# Patient Record
Sex: MALE | Race: WHITE | NOT HISPANIC OR LATINO | Employment: FULL TIME | URBAN - METROPOLITAN AREA
[De-identification: names, ages, dates, MRNs, and addresses within clinical notes are randomized per-mention and may not be internally consistent; named-entity substitution may affect disease eponyms.]

---

## 2020-08-06 ENCOUNTER — CONSULT (OUTPATIENT)
Dept: PULMONOLOGY | Facility: CLINIC | Age: 50
End: 2020-08-06
Payer: COMMERCIAL

## 2020-08-06 VITALS
SYSTOLIC BLOOD PRESSURE: 122 MMHG | BODY MASS INDEX: 41.3 KG/M2 | WEIGHT: 295 LBS | HEIGHT: 71 IN | TEMPERATURE: 98 F | DIASTOLIC BLOOD PRESSURE: 86 MMHG | HEART RATE: 100 BPM | OXYGEN SATURATION: 94 %

## 2020-08-06 DIAGNOSIS — E66.01 OBESITY, MORBID (HCC): ICD-10-CM

## 2020-08-06 DIAGNOSIS — J45.40 MODERATE PERSISTENT ASTHMA WITHOUT COMPLICATION: ICD-10-CM

## 2020-08-06 DIAGNOSIS — R06.02 SOB (SHORTNESS OF BREATH) ON EXERTION: Primary | ICD-10-CM

## 2020-08-06 PROCEDURE — 99205 OFFICE O/P NEW HI 60 MIN: CPT | Performed by: INTERNAL MEDICINE

## 2020-08-06 RX ORDER — MONTELUKAST SODIUM 10 MG/1
TABLET ORAL
COMMUNITY
Start: 2020-06-29

## 2020-08-06 RX ORDER — FLUTICASONE PROPIONATE 50 MCG
SPRAY, SUSPENSION (ML) NASAL
COMMUNITY
Start: 2020-06-29

## 2020-08-06 RX ORDER — THEOPHYLLINE 400 MG/1
TABLET, EXTENDED RELEASE ORAL
COMMUNITY
Start: 2020-07-18

## 2020-08-06 RX ORDER — BUDESONIDE 0.5 MG/2ML
INHALANT ORAL
COMMUNITY
Start: 2020-06-19

## 2020-08-06 RX ORDER — BENRALIZUMAB 30 MG/ML
INJECTION, SOLUTION SUBCUTANEOUS
COMMUNITY
Start: 2020-08-03

## 2020-08-06 RX ORDER — PREDNISONE 1 MG/1
TABLET ORAL
COMMUNITY
Start: 2020-07-22

## 2020-08-06 RX ORDER — COLCHICINE 0.6 MG/1
CAPSULE ORAL
COMMUNITY
Start: 2020-07-21

## 2020-08-06 RX ORDER — LISINOPRIL 5 MG/1
TABLET ORAL
COMMUNITY
Start: 2020-06-29

## 2020-08-06 RX ORDER — ALLOPURINOL 100 MG/1
TABLET ORAL
COMMUNITY
Start: 2020-06-29

## 2020-08-06 RX ORDER — CHOLECALCIFEROL (VITAMIN D3) 1250 MCG
CAPSULE ORAL
COMMUNITY
Start: 2020-06-17

## 2020-08-06 RX ORDER — PREDNISONE 10 MG/1
TABLET ORAL
COMMUNITY
Start: 2020-07-22

## 2020-08-06 RX ORDER — INDOMETHACIN 50 MG/1
CAPSULE ORAL
COMMUNITY
Start: 2020-05-19

## 2020-08-06 RX ORDER — ZOLMITRIPTAN 5 MG/1
5 TABLET, FILM COATED ORAL ONCE AS NEEDED
COMMUNITY

## 2020-08-06 RX ORDER — ALBUTEROL SULFATE 2.5 MG/3ML
SOLUTION RESPIRATORY (INHALATION)
COMMUNITY
Start: 2020-06-19

## 2020-08-06 RX ORDER — TRAMADOL HYDROCHLORIDE 50 MG/1
50 TABLET ORAL EVERY 6 HOURS PRN
COMMUNITY

## 2020-08-06 RX ORDER — TIOTROPIUM BROMIDE INHALATION SPRAY 3.12 UG/1
SPRAY, METERED RESPIRATORY (INHALATION)
COMMUNITY
Start: 2020-07-23

## 2020-08-06 NOTE — PROGRESS NOTES
Assessment/Plan:     Diagnoses and all orders for this visit:    SOB (shortness of breath) on exertion  -     Complete PFT with post Bronchodilator and Six Minute walk; Future  -     CT chest without contrast; Future  -     Stress test only, exercise; Future  -     Echo complete with contrast if indicated; Future    Moderate persistent asthma without complication    Obesity, morbid ([facility] [address] )    Other orders  -     albuterol (2 5 mg/3 mL) 0 083 % nebulizer solution; USE 1 VIAL IN NEBULIZER QID PRN  -     allopurinol (ZYLOPRIM) 100 mg tablet; TK 2 TS PO QAM AND 2 TS HS  -     Fasenra subcutaneous injection  -     budesonide (PULMICORT) 0 5 mg/2 mL nebulizer solution; VVN BID PRN  -     Cholecalciferol (Vitamin D3) 1 25 MG (45145 UT) CAPS; TK 1 C PO 1 TIME Q WK TAT  -     Colchicine 0 6 MG CAPS  -     fluticasone (FLONASE) 50 mcg/act nasal spray; SHAKE LQ AND U 2 SPRAYS IEN D  -     Advair Diskus 500-50 MCG/DOSE inhaler; INL 1 PUFF PO BID  -     indomethacin (INDOCIN) 50 mg capsule; TAKE 1 CAPSULE BY MOUTH EVERY 8 HOURS IF NEEDED DURING GOUT FLARE WITH FOOD  -     lisinopril (ZESTRIL) 5 mg tablet; TK 1 T PO D  -     metFORMIN (GLUCOPHAGE) 500 mg tablet; TK 2 TS PO BID  -     montelukast (SINGULAIR) 10 mg tablet; TK 1 T PO QD  -     predniSONE 10 mg tablet  -     predniSONE 1 mg tablet; TK 3 TS PO Q 8 H PRN  -     theophylline (UNIPHYL) 400 mg 24 hr tablet; TK 1 T PO BID  -     Spiriva Respimat 2 5 MCG/ACT AERS inhaler; INL 2 PFS PO D  -     ZOLMitriptan (ZOMIG) 5 MG tablet; Take 5 mg by mouth once as needed for migraine  -     traMADol (ULTRAM) 50 mg tablet;  Take 50 mg by mouth every 6 (six) hours as needed for moderate pain      Deepak with significant asthma history, at least moderate persistent to severe currently not in any exacerbation with immunotherapy shots in the past currently on faserna, and on long-acting bronchodilators as well as the on prednisone 20 mg daily for several years currently trying to cut it down   Shortness of breath and dyspnea on exertion likely multifactorial secondary to his asthma his obesity, need to rule out cardiac issues, and likely pulmonary hypertension from obesity and likely obstructive sleep apnea  Do not have any old records to compare where trying to obtain his the prior records from prior pulmonologist   Will get complete PFT with bronchodilator lung volumes and DLCO and a 6 minutes walk test and follow-up  Also given long-term prednisone usage, I have asked him to get a CT of the chest without contrast to rule out any opportunistic infections  I have also discussed with him to follow-up with a cardiologist, he wants to get a stress exercise test done 1st and see if he needs to follow-up he states he does not have any family history of early coronary artery disease but given his obesity I have discussed with him the risk for coronary artery disease understands and verbalizes  Will order the stress exercise test  Also echocardiogram to rule out any evidence of pulmonary hypertension  High suspicion for obstructive sleep apnea given history of snoring and his body habitus, wants to think about testing at a later time  Recommend weight loss  For his asthma I have asked him to continue with his current medications that he is on with Advair 250/51 puff twice daily  Spiriva 1 puff daily  Continue with the current dosing of prednisone has been tapered down to 12 5 mg and he is slowly tapering it down he states as per his prior pulmonologist  Continue with Singulair 10 mg daily  Continue with theophylline 400 mg daily  He states he has had a theophylline level with his prior pulmonologist will try to obtain the records otherwise will he would need a theophylline level also to be tested    He will continue with his the faserna shots   Discussed regarding flu shot fall 2020  Recommend weight loss  Have spent 60 minutes with the patient discussing likely etiology for his shortness of breath and dyspnea on exertion and the radius testing and answered patient's questions with regards to asthma and sleep disordered breathing    Return in about 6 weeks (around 9/17/2020)  All questions are answered to the patient's satisfaction and understanding  He verbalizes understanding  He is encouraged to call with any further questions or concerns  Portions of the record may have been created with voice recognition software  Occasional wrong word or "sound a like" substitutions may have occurred due to the inherent limitations of voice recognition software  Read the chart carefully and recognize, using context, where substitutions have occurred  a    Electronically Signed by Patrick Castillo MD    ______________________________________________________________________    Chief Complaint:   Chief Complaint   Patient presents with    Asthma        Patient ID: Saravanan Estrella is a 44 y o  y o  male has a past medical history of Asthma and Kidney stones  8/6/2020  Patient presents today for initial visit  Saravanan Estrella  is a very pleasant 49-year-old gentleman with less than 5 pack-year smoking history states he smoked only for 2-3 years less than a pack  Has been diagnosed with asthma since childhood, although he was admitted multiple times into the hospital when he was young , as an adult although he has not had multiple admissions recently, prior admissions did not require any intubation he states, last ER visit about a year ago  Has been on long-acting bronchodilators as well as on immunotherapy shots since he was young, recently has been switched to faserna shots, and he was also placed on prednisone 20 mg daily for a long time, and he has been slowly trying to get the prednisone down    Currently for the past few months he states his asthma has not been acting up, had does not wheeze as much as before, but still has shortness of breath and dyspnea on exertion, with limitation of his daily current activities, does not have any chest pain or chest tightness  Occupational/Exposure history:  Works as a salesman  Pets/Enviroment:  Hypoallergenic dog  Travel history: none  Review of Systems   Constitutional: Positive for fatigue and unexpected weight change (weight gain in the past few years )  Negative for appetite change, chills, diaphoresis and fever  HENT: Positive for postnasal drip  Negative for congestion, ear discharge, ear pain, nosebleeds, rhinorrhea, sinus pain, sore throat and voice change  Eyes: Negative for pain, discharge and visual disturbance  Respiratory: Positive for shortness of breath (on exertion gradually worsening )  Negative for apnea, cough, choking, chest tightness, wheezing and stridor  Cardiovascular: Negative for chest pain, palpitations and leg swelling  Gastrointestinal: Negative for abdominal pain, blood in stool, constipation, diarrhea and vomiting  Endocrine: Negative for cold intolerance, heat intolerance, polydipsia, polyphagia and polyuria  Genitourinary: Negative for difficulty urinating and dysuria  Musculoskeletal: Negative for arthralgias and neck pain  Skin: Negative for pallor and rash  Allergic/Immunologic: Negative for environmental allergies and food allergies  Neurological: Negative for dizziness, speech difficulty, weakness and light-headedness  Hematological: Negative for adenopathy  Does not bruise/bleed easily  Psychiatric/Behavioral: Negative for agitation, confusion and sleep disturbance  The patient is not nervous/anxious  Social history: He reports that he has quit smoking  His smoking use included cigarettes  He does not have any smokeless tobacco history on file  Past surgical history: No past surgical history on file  Family history:   Family History   Problem Relation Age of Onset    COPD Mother          There is no immunization history on file for this patient    Current Outpatient Medications   Medication Sig Dispense Refill    Advair Diskus 500-50 MCG/DOSE inhaler INL 1 PUFF PO BID      albuterol (2 5 mg/3 mL) 0 083 % nebulizer solution USE 1 VIAL IN NEBULIZER QID PRN      allopurinol (ZYLOPRIM) 100 mg tablet TK 2 TS PO QAM AND 2 TS HS      budesonide (PULMICORT) 0 5 mg/2 mL nebulizer solution VVN BID PRN      Cholecalciferol (Vitamin D3) 1 25 MG (71805 UT) CAPS TK 1 C PO 1 TIME Q WK TAT      Colchicine 0 6 MG CAPS       Fasenra subcutaneous injection       fluticasone (FLONASE) 50 mcg/act nasal spray SHAKE LQ AND U 2 SPRAYS IEN D      indomethacin (INDOCIN) 50 mg capsule TAKE 1 CAPSULE BY MOUTH EVERY 8 HOURS IF NEEDED DURING GOUT FLARE WITH FOOD      montelukast (SINGULAIR) 10 mg tablet TK 1 T PO QD      predniSONE 1 mg tablet TK 3 TS PO Q 8 H PRN      predniSONE 10 mg tablet       Spiriva Respimat 2 5 MCG/ACT AERS inhaler INL 2 PFS PO D      theophylline (UNIPHYL) 400 mg 24 hr tablet TK 1 T PO BID      traMADol (ULTRAM) 50 mg tablet Take 50 mg by mouth every 6 (six) hours as needed for moderate pain      ZOLMitriptan (ZOMIG) 5 MG tablet Take 5 mg by mouth once as needed for migraine      lisinopril (ZESTRIL) 5 mg tablet TK 1 T PO D      metFORMIN (GLUCOPHAGE) 500 mg tablet TK 2 TS PO BID       No current facility-administered medications for this visit  Allergies: Patient has no known allergies  Objective:  Vitals:    08/06/20 1510   BP: 122/86   Pulse: 100   Temp: 98 °F (36 7 °C)   SpO2: 94%   Weight: 134 kg (295 lb)   Height: 5' 11" (1 803 m)   Oxygen Therapy  SpO2: 94 %    Wt Readings from Last 3 Encounters:   08/06/20 134 kg (295 lb)     Body mass index is 41 14 kg/m²  Physical Exam  Constitutional:       Appearance: He is well-developed  HENT:      Head: Normocephalic and atraumatic  Eyes:      Pupils: Pupils are equal, round, and reactive to light  Neck:      Musculoskeletal: Normal range of motion and neck supple        Comments: Short and wide neck  Cardiovascular:      Rate and Rhythm: Normal rate and regular rhythm  Heart sounds: Normal heart sounds  Pulmonary:      Effort: Pulmonary effort is normal       Breath sounds: Normal breath sounds  Abdominal:      General: Bowel sounds are normal       Palpations: Abdomen is soft  Musculoskeletal: Normal range of motion  Skin:     General: Skin is warm and dry  Neurological:      Mental Status: He is alert and oriented to person, place, and time  Psychiatric:         Behavior: Behavior normal            Diagnostics:  I have personally reviewed pertinent reports

## 2020-10-09 ENCOUNTER — TELEPHONE (OUTPATIENT)
Dept: PULMONOLOGY | Facility: CLINIC | Age: 50
End: 2020-10-09

## 2020-10-15 ENCOUNTER — HOSPITAL ENCOUNTER (OUTPATIENT)
Dept: NON INVASIVE DIAGNOSTICS | Facility: HOSPITAL | Age: 50
Discharge: HOME | End: 2020-10-15
Attending: INTERNAL MEDICINE
Payer: COMMERCIAL

## 2020-10-15 ENCOUNTER — APPOINTMENT (OUTPATIENT)
Dept: CT IMAGING | Facility: HOSPITAL | Age: 50
End: 2020-10-15
Attending: INTERNAL MEDICINE
Payer: COMMERCIAL

## 2020-10-15 DIAGNOSIS — R06.02 SOB (SHORTNESS OF BREATH) ON EXERTION: ICD-10-CM

## 2020-10-15 LAB
CHEST PAIN STATEMENT: NORMAL
MAX DIASTOLIC BP: 110 MMHG
MAX HEART RATE: 190 BPM
MAX PREDICTED HEART RATE: 168 BPM
MAX. SYSTOLIC BP: 230 MMHG
PROTOCOL NAME: NORMAL
REASON FOR TERMINATION: NORMAL
TARGET HR FORMULA: NORMAL
TEST INDICATION: NORMAL
TIME IN EXERCISE PHASE: NORMAL

## 2020-10-15 PROCEDURE — 93016 CV STRESS TEST SUPVJ ONLY: CPT

## 2020-10-15 PROCEDURE — 93018 CV STRESS TEST I&R ONLY: CPT

## 2020-10-15 PROCEDURE — 93306 TTE W/DOPPLER COMPLETE: CPT | Performed by: INTERNAL MEDICINE

## 2020-10-15 PROCEDURE — 93306 TTE W/DOPPLER COMPLETE: CPT

## 2020-10-15 PROCEDURE — 93017 CV STRESS TEST TRACING ONLY: CPT

## 2020-10-23 ENCOUNTER — HOSPITAL ENCOUNTER (OUTPATIENT)
Dept: PULMONOLOGY | Facility: HOSPITAL | Age: 50
Discharge: HOME | End: 2020-10-23
Attending: INTERNAL MEDICINE
Payer: COMMERCIAL

## 2020-10-23 DIAGNOSIS — R06.02 SOB (SHORTNESS OF BREATH) ON EXERTION: ICD-10-CM

## 2020-10-23 PROCEDURE — 94729 DIFFUSING CAPACITY: CPT

## 2020-10-23 PROCEDURE — 94761 N-INVAS EAR/PLS OXIMETRY MLT: CPT

## 2020-10-23 PROCEDURE — 94618 PULMONARY STRESS TESTING: CPT | Performed by: INTERNAL MEDICINE

## 2020-10-23 PROCEDURE — 94010 BREATHING CAPACITY TEST: CPT | Performed by: INTERNAL MEDICINE

## 2020-10-23 PROCEDURE — 94727 GAS DIL/WSHOT DETER LNG VOL: CPT

## 2020-10-23 PROCEDURE — 94727 GAS DIL/WSHOT DETER LNG VOL: CPT | Performed by: INTERNAL MEDICINE

## 2020-10-23 PROCEDURE — 94010 BREATHING CAPACITY TEST: CPT

## 2020-10-23 PROCEDURE — 94729 DIFFUSING CAPACITY: CPT | Performed by: INTERNAL MEDICINE

## 2024-07-10 LAB
CREAT ?TM UR-SCNC: 243.1 UMOL/L
EXT ALBUMIN URINE RANDOM: 12
HBA1C MFR BLD HPLC: 4.7 %
MICROALBUMIN/CREAT UR: 4.9 MG/G{CREAT}

## 2025-01-28 LAB — HBA1C MFR BLD HPLC: 4.6 %

## 2025-03-12 ENCOUNTER — TELEPHONE (OUTPATIENT)
Dept: SURGERY | Facility: CLINIC | Age: 55
End: 2025-03-12

## 2025-03-12 NOTE — TELEPHONE ENCOUNTER
Pt called, he was transferred over from Dr. Rosenbaum office. Pt needs to be seen for inguinal hernia. He states that Dr. Rosenbaum office does not have anything available for surgery after April. I informed him that I'm scheduling into April at the moment with both providers. He is scheduled to see Dr. Krueger on 3/19/25. In the meantime if it becomes more painful and protruding out more to please go the ED. Pt verbalized understanding.

## 2025-03-13 ENCOUNTER — TELEPHONE (OUTPATIENT)
Age: 55
End: 2025-03-13

## 2025-03-13 NOTE — TELEPHONE ENCOUNTER
Pt called and has an appointment on 03/19 with Dr. Krueger.  He is in a lot of pain and doesn't think he can wait until next week . He asked if Dr. Krueger could fit him in today.  I told him that I would ask but I did tell him that his schedule is full today.  Please call patient at [phone].

## 2025-03-14 ENCOUNTER — TELEPHONE (OUTPATIENT)
Age: 55
End: 2025-03-14

## 2025-03-14 ENCOUNTER — APPOINTMENT (EMERGENCY)
Dept: RADIOLOGY | Facility: HOSPITAL | Age: 55
End: 2025-03-14
Payer: COMMERCIAL

## 2025-03-14 ENCOUNTER — APPOINTMENT (EMERGENCY)
Dept: CT IMAGING | Facility: HOSPITAL | Age: 55
End: 2025-03-14
Payer: COMMERCIAL

## 2025-03-14 ENCOUNTER — HOSPITAL ENCOUNTER (EMERGENCY)
Facility: HOSPITAL | Age: 55
Discharge: HOME | End: 2025-03-14
Payer: COMMERCIAL

## 2025-03-14 VITALS
TEMPERATURE: 98 F | OXYGEN SATURATION: 98 % | SYSTOLIC BLOOD PRESSURE: 166 MMHG | HEIGHT: 71 IN | WEIGHT: 205 LBS | BODY MASS INDEX: 28.7 KG/M2 | HEART RATE: 56 BPM | RESPIRATION RATE: 16 BRPM | DIASTOLIC BLOOD PRESSURE: 84 MMHG

## 2025-03-14 DIAGNOSIS — M10.9 GOUT: ICD-10-CM

## 2025-03-14 DIAGNOSIS — K42.9 UMBILICAL HERNIA: Primary | ICD-10-CM

## 2025-03-14 DIAGNOSIS — M79.673 FOOT PAIN: ICD-10-CM

## 2025-03-14 LAB
ALBUMIN SERPL BCG-MCNC: 4.5 G/DL (ref 3.5–5)
ALP SERPL-CCNC: 111 U/L (ref 34–104)
ALT SERPL W P-5'-P-CCNC: 11 U/L (ref 7–52)
ANION GAP SERPL CALCULATED.3IONS-SCNC: 7 MMOL/L (ref 4–13)
AST SERPL W P-5'-P-CCNC: 19 U/L (ref 13–39)
BASOPHILS # BLD AUTO: 0.01 THOUSANDS/ÂΜL (ref 0–0.1)
BASOPHILS NFR BLD AUTO: 0 % (ref 0–1)
BILIRUB SERPL-MCNC: 0.74 MG/DL (ref 0.2–1)
BUN SERPL-MCNC: 17 MG/DL (ref 5–25)
CALCIUM SERPL-MCNC: 9.8 MG/DL (ref 8.4–10.2)
CHLORIDE SERPL-SCNC: 104 MMOL/L (ref 96–108)
CO2 SERPL-SCNC: 28 MMOL/L (ref 21–32)
CREAT SERPL-MCNC: 0.95 MG/DL (ref 0.6–1.3)
EOSINOPHIL # BLD AUTO: 0 THOUSAND/ÂΜL (ref 0–0.61)
EOSINOPHIL NFR BLD AUTO: 0 % (ref 0–6)
ERYTHROCYTE [DISTWIDTH] IN BLOOD BY AUTOMATED COUNT: 13.4 % (ref 11.6–15.1)
GFR SERPL CREATININE-BSD FRML MDRD: 88 ML/MIN/1.73SQ M
GLUCOSE SERPL-MCNC: 72 MG/DL (ref 65–140)
HCT VFR BLD AUTO: 43.9 % (ref 36.5–49.3)
HGB BLD-MCNC: 15 G/DL (ref 12–17)
IMM GRANULOCYTES # BLD AUTO: 0.03 THOUSAND/UL (ref 0–0.2)
IMM GRANULOCYTES NFR BLD AUTO: 0 % (ref 0–2)
LACTATE SERPL-SCNC: 0.6 MMOL/L (ref 0.5–2)
LIPASE SERPL-CCNC: 81 U/L (ref 11–82)
LYMPHOCYTES # BLD AUTO: 2.89 THOUSANDS/ÂΜL (ref 0.6–4.47)
LYMPHOCYTES NFR BLD AUTO: 40 % (ref 14–44)
MCH RBC QN AUTO: 29.1 PG (ref 26.8–34.3)
MCHC RBC AUTO-ENTMCNC: 34.2 G/DL (ref 31.4–37.4)
MCV RBC AUTO: 85 FL (ref 82–98)
MONOCYTES # BLD AUTO: 0.71 THOUSAND/ÂΜL (ref 0.17–1.22)
MONOCYTES NFR BLD AUTO: 10 % (ref 4–12)
NEUTROPHILS # BLD AUTO: 3.67 THOUSANDS/ÂΜL (ref 1.85–7.62)
NEUTS SEG NFR BLD AUTO: 50 % (ref 43–75)
NRBC BLD AUTO-RTO: 0 /100 WBCS
PLATELET # BLD AUTO: 197 THOUSANDS/UL (ref 149–390)
PMV BLD AUTO: 9 FL (ref 8.9–12.7)
POTASSIUM SERPL-SCNC: 3.7 MMOL/L (ref 3.5–5.3)
PROT SERPL-MCNC: 7.4 G/DL (ref 6.4–8.4)
RBC # BLD AUTO: 5.15 MILLION/UL (ref 3.88–5.62)
SODIUM SERPL-SCNC: 139 MMOL/L (ref 135–147)
WBC # BLD AUTO: 7.31 THOUSAND/UL (ref 4.31–10.16)

## 2025-03-14 PROCEDURE — 83605 ASSAY OF LACTIC ACID: CPT

## 2025-03-14 PROCEDURE — 96375 TX/PRO/DX INJ NEW DRUG ADDON: CPT

## 2025-03-14 PROCEDURE — 73630 X-RAY EXAM OF FOOT: CPT

## 2025-03-14 PROCEDURE — 85025 COMPLETE CBC W/AUTO DIFF WBC: CPT

## 2025-03-14 PROCEDURE — 74177 CT ABD & PELVIS W/CONTRAST: CPT

## 2025-03-14 PROCEDURE — 36415 COLL VENOUS BLD VENIPUNCTURE: CPT

## 2025-03-14 PROCEDURE — 99284 EMERGENCY DEPT VISIT MOD MDM: CPT

## 2025-03-14 PROCEDURE — 80053 COMPREHEN METABOLIC PANEL: CPT

## 2025-03-14 PROCEDURE — 96374 THER/PROPH/DIAG INJ IV PUSH: CPT

## 2025-03-14 PROCEDURE — 83690 ASSAY OF LIPASE: CPT

## 2025-03-14 RX ORDER — ACETAMINOPHEN 500 MG
1000 TABLET ORAL EVERY 6 HOURS PRN
Qty: 60 TABLET | Refills: 0 | Status: SHIPPED | OUTPATIENT
Start: 2025-03-14

## 2025-03-14 RX ORDER — KETOROLAC TROMETHAMINE 30 MG/ML
15 INJECTION, SOLUTION INTRAMUSCULAR; INTRAVENOUS ONCE
Status: COMPLETED | OUTPATIENT
Start: 2025-03-14 | End: 2025-03-14

## 2025-03-14 RX ORDER — OXYCODONE HYDROCHLORIDE 5 MG/1
5 TABLET ORAL ONCE
Refills: 0 | Status: COMPLETED | OUTPATIENT
Start: 2025-03-14 | End: 2025-03-14

## 2025-03-14 RX ORDER — PREDNISONE 20 MG/1
40 TABLET ORAL DAILY
Qty: 10 TABLET | Refills: 0 | Status: SHIPPED | OUTPATIENT
Start: 2025-03-14 | End: 2025-03-19

## 2025-03-14 RX ORDER — OXYCODONE HYDROCHLORIDE 5 MG/1
5 TABLET ORAL EVERY 4 HOURS PRN
Qty: 10 TABLET | Refills: 0 | Status: SHIPPED | OUTPATIENT
Start: 2025-03-14 | End: 2025-03-21 | Stop reason: SDUPTHER

## 2025-03-14 RX ORDER — ONDANSETRON 2 MG/ML
4 INJECTION INTRAMUSCULAR; INTRAVENOUS EVERY 6 HOURS PRN
Status: DISCONTINUED | OUTPATIENT
Start: 2025-03-14 | End: 2025-03-14 | Stop reason: HOSPADM

## 2025-03-14 RX ADMIN — KETOROLAC TROMETHAMINE 15 MG: 30 INJECTION, SOLUTION INTRAMUSCULAR; INTRAVENOUS at 17:18

## 2025-03-14 RX ADMIN — MORPHINE SULFATE 2 MG: 2 INJECTION, SOLUTION INTRAMUSCULAR; INTRAVENOUS at 17:16

## 2025-03-14 RX ADMIN — OXYCODONE HYDROCHLORIDE 5 MG: 5 TABLET ORAL at 20:08

## 2025-03-14 RX ADMIN — IOHEXOL 90 ML: 350 INJECTION, SOLUTION INTRAVENOUS at 18:23

## 2025-03-14 NOTE — ED PROVIDER NOTES
Time reflects when diagnosis was documented in both MDM as applicable and the Disposition within this note       Time User Action Codes Description Comment    3/14/2025  7:22 PM Chiki Nye [K42.9] Umbilical hernia     3/14/2025  7:23 PM Chiki Nye [M79.673] Foot pain     3/14/2025  7:33 PM Chiki Nye [M10.9] Gout           ED Disposition       ED Disposition   Discharge    Condition   Stable    Date/Time   Fri Mar 14, 2025  7:22 PM    Comment   Deepak Aparicio discharge to home/self care.                   Assessment & Plan       Medical Decision Making  55 year old male presenting today with multiple complaints of right lower pelvic/abdominal hernia which is now painful as well as left foot pain. Hx hernia in this area, and history of gout specifically in this area of his foot.  Labs, CT  Pain medication.  XR foot to rule out fracture.  Plain radiograph(s) were reviewed by me, please see above documentation.  Labs reassuring.  Lactic negative.  CT with fat containing hernia, without strangulation.  Patient with improvement of pain after medication.  Patient has an appt with Dr Hirsch with Gen surg this next Wednesday, will have him keep this appointment for further discussion.  ------------------------------------------------------------  Strict return precautions discussed. Patient at time of discharge well-appearing in no acute distress, all questions answered. Patient agreeable to plan.  Patient's vitals, lab/imaging results, diagnosis, and treatment plan were discussed with the patient. All new/changed medications were discussed with patient, specifically, route of administration, how often and when to take, and where they can be picked up. Strict return precautions as well as close follow up with PCP was discussed with the patient and the patient was agreeable to my recommendations.  Patient verbally acknowledged understanding of the above communications. All labs reviewed and utilized  "in the medical decision making process (if labs were ordered). Portions of the record may have been created with voice recognition software.  Occasional wrong word or \"sound a like\" substitutions may have occurred due to the inherent limitations of voice recognition software.  Read the chart carefully and recognize, using context, where substitutions have occurred.      Amount and/or Complexity of Data Reviewed  Labs: ordered.  Radiology: ordered and independent interpretation performed.    Risk  OTC drugs.  Prescription drug management.             Medications   ketorolac (TORADOL) injection 15 mg (15 mg Intravenous Given 3/14/25 1718)   morphine injection 2 mg (2 mg Intravenous Given 3/14/25 1716)   iohexol (OMNIPAQUE) 350 MG/ML injection (MULTI-DOSE) 90 mL (90 mL Intravenous Given 3/14/25 1823)   oxyCODONE (ROXICODONE) IR tablet 5 mg (5 mg Oral Given 3/14/25 2008)       ED Risk Strat Scores                            SBIRT 20yo+      Flowsheet Row Most Recent Value   Initial Alcohol Screen: US AUDIT-C     1. How often do you have a drink containing alcohol? 0 Filed at: 03/14/2025 1629   2. How many drinks containing alcohol do you have on a typical day you are drinking?  0 Filed at: 03/14/2025 1629   3a. Male UNDER 65: How often do you have five or more drinks on one occasion? 0 Filed at: 03/14/2025 1629   3b. FEMALE Any Age, or MALE 65+: How often do you have 4 or more drinks on one occassion? 0 Filed at: 03/14/2025 1629   Audit-C Score 0 Filed at: 03/14/2025 1629   JOSUE: How many times in the past year have you...    Used an illegal drug or used a prescription medication for non-medical reasons? Never Filed at: 03/14/2025 1629                            History of Present Illness       Chief Complaint   Patient presents with    Pain     Pt has had Right inguinal hernia for about 6 months pain has increased pain x 3 days         Past Medical History:   Diagnosis Date    Asthma     Kidney stones       History " "reviewed. No pertinent surgical history.   Family History   Problem Relation Age of Onset    COPD Mother       Social History     Tobacco Use    Smoking status: Former     Types: Cigarettes    Smokeless tobacco: Never    Tobacco comments:     only smoked for two years      E-Cigarette/Vaping      E-Cigarette/Vaping Substances      I have reviewed and agree with the history as documented.     55-year-old male presenting today with concerns of right inguinal pain.  He states that he had hernia in this area for the last 6 months.  It has been able to be reduced without issue until 3 days ago when it feels like it \"got stuck\" and will not push back in.  He is trying to push very hard on it but it hurts too much and is amenable to push back into it 3 days.  Denies any enlargement of it in the last 3 days but it has been getting more painful.  Denies any overlying skin changes.  He has been using a belt with some mild relief of the pain.  Denies any other symptoms like nausea or vomiting.  He does admit to some left foot pain, has a history of gout is not been taking his medication, thinks that this is probably what that is.  Denies any injuries to the foot. No other symptoms at this time.        Review of Systems   Constitutional:  Negative for chills and fever.   HENT:  Negative for ear pain and sore throat.    Eyes:  Negative for pain and visual disturbance.   Respiratory:  Negative for cough and shortness of breath.    Cardiovascular:  Negative for chest pain and palpitations.   Gastrointestinal:  Positive for abdominal pain. Negative for constipation, diarrhea, nausea and vomiting.   Genitourinary:  Negative for decreased urine volume, dysuria, flank pain, frequency, hematuria, penile swelling, scrotal swelling, testicular pain and urgency.   Musculoskeletal:  Negative for arthralgias and back pain.   Skin:  Negative for color change and rash.   Neurological:  Negative for seizures and syncope.   All other systems " reviewed and are negative.          Objective       ED Triage Vitals   Temperature Pulse Blood Pressure Respirations SpO2 Patient Position - Orthostatic VS   03/14/25 1500 03/14/25 1458 03/14/25 1458 03/14/25 1458 03/14/25 1458 03/14/25 1630   98 °F (36.7 °C) 62 160/69 16 98 % Sitting      Temp Source Heart Rate Source BP Location FiO2 (%) Pain Score    03/14/25 1458 03/14/25 1630 03/14/25 1630 -- 03/14/25 1458    Oral Monitor Right arm  7      Vitals      Date and Time Temp Pulse SpO2 Resp BP Pain Score FACES Pain Rating User   03/14/25 2008 -- -- -- -- -- 7 -- [facility]   03/14/25 1716 -- -- -- -- -- 7 -- [facility]   03/14/25 1630 -- 56 98 % 16 166/84 7 -- JMR   03/14/25 1500 98 °F (36.7 °C) -- -- -- -- -- -- RLN   03/14/25 1458 -- 62 98 % 16 160/69 7 -- RLN            Physical Exam  Vitals and nursing note reviewed.   Constitutional:       General: He is not in acute distress.     Appearance: He is not ill-appearing.   HENT:      Head: Normocephalic and atraumatic.   Eyes:      General: No scleral icterus.     Conjunctiva/sclera: Conjunctivae normal.      Pupils: Pupils are equal, round, and reactive to light.   Cardiovascular:      Rate and Rhythm: Normal rate and regular rhythm.      Pulses: Normal pulses.   Pulmonary:      Effort: Pulmonary effort is normal. No respiratory distress.   Abdominal:      Palpations: There is no mass.      Tenderness: There is abdominal tenderness.      Hernia: A hernia (right suprapubic / inguinal hernia with TTP. No overlying skin changes.) is present.   Musculoskeletal:         General: Normal range of motion.      Cervical back: Normal range of motion.   Skin:     General: Skin is warm and dry.      Capillary Refill: Capillary refill takes less than 2 seconds.      Coloration: Skin is not jaundiced.   Neurological:      Mental Status: He is alert and oriented to person, place, and time.         Results Reviewed       Procedure Component Value Units Date/Time    Lactic acid, plasma  (w/reflex if result > 2.0) [[id number]]  (Normal) Collected: 03/14/25 1721    Lab Status: Final result Specimen: Blood from Arm, Left Updated: 03/14/25 1740     LACTIC ACID 0.6 mmol/L     Narrative:      Result may be elevated if tourniquet was used during collection.    Comprehensive metabolic panel [[id number]]  (Abnormal) Collected: 03/14/25 1459    Lab Status: Final result Specimen: Blood from Arm, Left Updated: 03/14/25 1524     Sodium 139 mmol/L      Potassium 3.7 mmol/L      Chloride 104 mmol/L      CO2 28 mmol/L      ANION GAP 7 mmol/L      BUN 17 mg/dL      Creatinine 0.95 mg/dL      Glucose 72 mg/dL      Calcium 9.8 mg/dL      AST 19 U/L      ALT 11 U/L      Alkaline Phosphatase 111 U/L      Total Protein 7.4 g/dL      Albumin 4.5 g/dL      Total Bilirubin 0.74 mg/dL      eGFR 88 ml/min/1.73sq m     Narrative:      National Kidney [employer] Foundation guidelines for Chronic Kidney Disease (CKD):     Stage 1 with normal or high GFR (GFR > 90 mL/min/1.73 square meters)    Stage 2 Mild CKD (GFR = 60-89 mL/min/1.73 square meters)    Stage 3A Moderate CKD (GFR = 45-59 mL/min/1.73 square meters)    Stage 3B Moderate CKD (GFR = 30-44 mL/min/1.73 square meters)    Stage 4 Severe CKD (GFR = 15-29 mL/min/1.73 square meters)    Stage 5 End Stage CKD (GFR <15 mL/min/1.73 square meters)  Note: GFR calculation is accurate only with a steady state creatinine    Lipase [[id number]]  (Normal) Collected: 03/14/25 1459    Lab Status: Final result Specimen: Blood from Arm, Left Updated: 03/14/25 1524     Lipase 81 u/L     CBC and differential [[id number]] Collected: 03/14/25 1459    Lab Status: Final result Specimen: Blood from Arm, Left Updated: 03/14/25 1506     WBC 7.31 Thousand/uL      RBC 5.15 Million/uL      Hemoglobin 15.0 g/dL      Hematocrit 43.9 %      MCV 85 fL      MCH 29.1 pg      MCHC 34.2 g/dL      RDW 13.4 %      MPV 9.0 fL      Platelets 197 Thousands/uL      nRBC 0 /100 WBCs      Segmented % 50 %      Immature  Grans % 0 %      Lymphocytes % 40 %      Monocytes % 10 %      Eosinophils Relative 0 %      Basophils Relative 0 %      Absolute Neutrophils 3.67 Thousands/µL      Absolute Immature Grans 0.03 Thousand/uL      Absolute Lymphocytes 2.89 Thousands/µL      Absolute Monocytes 0.71 Thousand/µL      Eosinophils Absolute 0.00 Thousand/µL      Basophils Absolute 0.01 Thousands/µL             CT abdomen pelvis with contrast   Final Interpretation by Alexis Cortez MD (03/14 1910)      Small fat-containing umbilical hernia. No internal stranding identified.      Colonic diverticulosis without evidence for acute diverticulitis.      Nonobstructive nephrolithiasis.         Workstation performed: [id number]         XR foot 3+ views LEFT   ED Interpretation by Chiki Nye PA-C (03/14 1707)   No acute fracture or dislocation      Final Interpretation by Taryn Britton MD (03/15 0827)      No acute osseous abnormality.      Arthritic changes at the interphalangeal joint of the second through fifth toes   No radiographic findings of gouty arthropathy   Computerized Assisted Algorithm (CAA) may have been used to analyze all applicable images.         Resident: Martha Streeter I, the attending radiologist, have reviewed the images and agree with the final report above.      Workstation performed: [id number]             Procedures    ED Medication and Procedure Management   Prior to Admission Medications   Prescriptions Last Dose Informant Patient Reported? Taking?   Advair Diskus 500-50 MCG/DOSE inhaler   Yes No   Sig: INL 1 PUFF PO BID   Cholecalciferol (Vitamin D3) 1.25 MG (34981 UT) CAPS   Yes No   Sig: TK 1 C PO 1 TIME Q WK TAT   Colchicine 0.6 MG CAPS   Yes No   Fasenra subcutaneous injection   Yes No   Spiriva Respimat 2.5 MCG/ACT AERS inhaler   Yes No   Sig: INL 2 PFS PO D   ZOLMitriptan (ZOMIG) 5 MG tablet   Yes No   Sig: Take 5 mg by mouth once as needed for migraine   albuterol (2.5 mg/3 mL) 0.083 % nebulizer solution    Yes No   Sig: USE 1 VIAL IN NEBULIZER QID PRN   allopurinol (ZYLOPRIM) 100 mg tablet   Yes No   Sig: TK 2 TS PO QAM AND 2 TS HS   budesonide (PULMICORT) 0.5 mg/2 mL nebulizer solution   Yes No   Sig: VVN BID PRN   fluticasone (FLONASE) 50 mcg/act nasal spray   Yes No   Sig: SHAKE LQ AND U 2 SPRAYS IEN D   indomethacin (INDOCIN) 50 mg capsule   Yes No   Sig: TAKE 1 CAPSULE BY MOUTH EVERY 8 HOURS IF NEEDED DURING GOUT FLARE WITH FOOD   lisinopril (ZESTRIL) 5 mg tablet   Yes No   Sig: TK 1 T PO D   metFORMIN (GLUCOPHAGE) 500 mg tablet   Yes No   Sig: TK 2 TS PO BID   montelukast (SINGULAIR) 10 mg tablet   Yes No   Sig: TK 1 T PO QD   predniSONE 1 mg tablet   Yes No   Sig: TK 3 TS PO Q 8 H PRN   predniSONE 10 mg tablet   Yes No   theophylline (UNIPHYL) 400 mg 24 hr tablet   Yes No   Sig: TK 1 T PO BID   traMADol (ULTRAM) 50 mg tablet   Yes No   Sig: Take 50 mg by mouth every 6 (six) hours as needed for moderate pain      Facility-Administered Medications: None     Discharge Medication List as of 3/14/2025  7:42 PM        START taking these medications    Details   acetaminophen (TYLENOL) 500 mg tablet Take 2 tablets (1,000 mg total) by mouth every 6 (six) hours as needed for mild pain, Starting Fri 3/14/2025, Normal      oxyCODONE (Roxicodone) 5 immediate release tablet Take 1 tablet (5 mg total) by mouth every 4 (four) hours as needed for moderate pain for up to 10 days Max Daily Amount: 30 mg, Starting Fri 3/14/2025, Until Mon 3/24/2025 at 2359, Normal      !! predniSONE 20 mg tablet Take 2 tablets (40 mg total) by mouth daily for 5 days, Starting Fri 3/14/2025, Until Wed 3/19/2025, Normal       !! - Potential duplicate medications found. Please discuss with provider.        CONTINUE these medications which have NOT CHANGED    Details   Advair Diskus 500-50 MCG/DOSE inhaler INL 1 PUFF PO BID, Historical Med      albuterol (2.5 mg/3 mL) 0.083 % nebulizer solution USE 1 VIAL IN NEBULIZER QID PRN, Historical Med       allopurinol (ZYLOPRIM) 100 mg tablet TK 2 TS PO QAM AND 2 TS HS, Historical Med      budesonide (PULMICORT) 0.5 mg/2 mL nebulizer solution VVN BID PRN, Historical Med      Cholecalciferol (Vitamin D3) 1.25 MG (11699 UT) CAPS TK 1 C PO 1 TIME Q WK TAT, Historical Med      Colchicine 0.6 MG CAPS Starting Tue 7/21/2020, Historical Med      Fasenra subcutaneous injection Starting Mon 8/3/2020, Historical Med      fluticasone (FLONASE) 50 mcg/act nasal spray SHAKE LQ AND U 2 SPRAYS IEN D, Historical Med      indomethacin (INDOCIN) 50 mg capsule TAKE 1 CAPSULE BY MOUTH EVERY 8 HOURS IF NEEDED DURING GOUT FLARE WITH FOOD, Historical Med      lisinopril (ZESTRIL) 5 mg tablet TK 1 T PO D, Historical Med      metFORMIN (GLUCOPHAGE) 500 mg tablet TK 2 TS PO BID, Historical Med      montelukast (SINGULAIR) 10 mg tablet TK 1 T PO QD, Historical Med      !! predniSONE 1 mg tablet TK 3 TS PO Q 8 H PRN, Historical Med      !! predniSONE 10 mg tablet Starting Wed 7/22/2020, Historical Med      Spiriva Respimat 2.5 MCG/ACT AERS inhaler INL 2 PFS PO D, Historical Med      theophylline (UNIPHYL) 400 mg 24 hr tablet TK 1 T PO BID, Historical Med      traMADol (ULTRAM) 50 mg tablet Take 50 mg by mouth every 6 (six) hours as needed for moderate pain, Historical Med      ZOLMitriptan (ZOMIG) 5 MG tablet Take 5 mg by mouth once as needed for migraine, Historical Med       !! - Potential duplicate medications found. Please discuss with provider.        No discharge procedures on file.  ED SEPSIS DOCUMENTATION   Time reflects when diagnosis was documented in both MDM as applicable and the Disposition within this note       Time User Action Codes Description Comment    3/14/2025  7:22 PM Chiki Nye [K42.9] Umbilical hernia     3/14/2025  7:23 PM Chiki Nye [M79.673] Foot pain     3/14/2025  7:33 PM Chiki Nye [M10.9] Gout                  Chiki Nye PA-C  03/15/25 0903

## 2025-03-14 NOTE — TELEPHONE ENCOUNTER
PT called to inform us that his hernia is really bad and is heading to ER at the [facility] in [place]. I did try to ask him to go to the [facility] on [facility] where Jerald Garber is located but his prefers to go where his brother had originally told him to go.  PT told me to relay the message to Jerald Garber and Nitesh Krueger and the hospital about this.  I also gave the PT the [facility] phone number as well.

## 2025-03-17 ENCOUNTER — TELEPHONE (OUTPATIENT)
Age: 55
End: 2025-03-17

## 2025-03-17 NOTE — TELEPHONE ENCOUNTER
Spoke with pt and relayed Dr. Krueger's advise. Pt verbalized understanding. He will see his PCP before his visit here.

## 2025-03-17 NOTE — TELEPHONE ENCOUNTER
Patient calling about upcoming appointment on 3/19 for hernia consult with Dr Krueger.    Patient was seen in ED over the weekend for umbilical pain.  He saw telehealth last night who determined he has shingles. He started on Valacyclovir last night.  Spoke with office and was advised to reschedule pt for 4 weeks.  Rescheduled for 4/15/25.    Patient questioned if his imaging from ED could be looked at and surgery scheduled based on that. He also questioned if he could do a virtual consult.    Stated he was wearing a hernia belt but now he is having pain from the shingles when wearing it.    Advise if any recommendations on management  Cb#[phone]

## 2025-03-18 ENCOUNTER — OFFICE VISIT (OUTPATIENT)
Dept: FAMILY MEDICINE CLINIC | Facility: CLINIC | Age: 55
End: 2025-03-18
Payer: COMMERCIAL

## 2025-03-18 VITALS
RESPIRATION RATE: 18 BRPM | OXYGEN SATURATION: 98 % | BODY MASS INDEX: 30.52 KG/M2 | HEIGHT: 71 IN | SYSTOLIC BLOOD PRESSURE: 140 MMHG | DIASTOLIC BLOOD PRESSURE: 80 MMHG | HEART RATE: 70 BPM | WEIGHT: 218 LBS

## 2025-03-18 DIAGNOSIS — B02.9 HERPES ZOSTER WITHOUT COMPLICATION: ICD-10-CM

## 2025-03-18 DIAGNOSIS — E11.9 TYPE 2 DIABETES MELLITUS WITHOUT COMPLICATION, WITHOUT LONG-TERM CURRENT USE OF INSULIN (HCC): ICD-10-CM

## 2025-03-18 DIAGNOSIS — K40.90 UNILATERAL INGUINAL HERNIA WITHOUT OBSTRUCTION OR GANGRENE, RECURRENCE NOT SPECIFIED: ICD-10-CM

## 2025-03-18 DIAGNOSIS — J45.50 SEVERE PERSISTENT ASTHMA WITHOUT COMPLICATION: Primary | ICD-10-CM

## 2025-03-18 PROCEDURE — 99204 OFFICE O/P NEW MOD 45 MIN: CPT | Performed by: FAMILY MEDICINE

## 2025-03-18 RX ORDER — AMITRIPTYLINE HYDROCHLORIDE 10 MG/1
10 TABLET ORAL
Qty: 30 TABLET | Refills: 2 | Status: SHIPPED | OUTPATIENT
Start: 2025-03-18

## 2025-03-18 NOTE — ASSESSMENT & PLAN NOTE
Controlled, continue current care  We discussed the importance of controlling blood glucose (hemoglobin A1c less than 7.0)Premeal , even better <110  2hr after a meal <170, even better <140  A1C <7%, even better <6.5%.  blood pressure control, and cholesterol to lower the risk for complications. Encouraged advise to check home blood sugars discussed goals in range of therapy. Reviewed recommendations of annual eye exams (ophthalmology) on long foot exam (Podiatry)  Lab Results   Component Value Date    HGBA1C 4.6 01/28/2025

## 2025-03-18 NOTE — PROGRESS NOTES
Name: Deepak Aparicio      : 1970      MRN: [id number]  Encounter Provider: JASON Colby  Encounter Date: 3/18/2025   Encounter department: [facility] [address]  :  Assessment & Plan  Severe persistent asthma without complication  Stable not in exacerbation continue current care per pulmonary       Type 2 diabetes mellitus without complication, without long-term current use of insulin (HCC)  Controlled, continue current care  We discussed the importance of controlling blood glucose (hemoglobin A1c less than 7.0)Premeal , even better <110  2hr after a meal <170, even better <140  A1C <7%, even better <6.5%.  blood pressure control, and cholesterol to lower the risk for complications. Encouraged advise to check home blood sugars discussed goals in range of therapy. Reviewed recommendations of annual eye exams (ophthalmology) on long foot exam (Podiatry)  Lab Results   Component Value Date    HGBA1C 4.6 2025            Herpes zoster without complication  Discussed findings with patient dermatomal pattern, continue Famvir till completion, Elavil nightly, avoid contact, with vesicle fluid,  Orders:    amitriptyline (ELAVIL) 10 mg tablet; Take 1 tablet (10 mg total) by mouth daily at bedtime    Unilateral inguinal hernia without obstruction or gangrene, recurrence not specified  Discussed red flags, maintain scheduled appointment with surgical team             Depression Screening and Follow-up Plan: Patient was screened for depression during today's encounter. They screened negative with a PHQ-2 score of 0.        History of Present Illness   Here to establish   Developed shingles   Famvir prednisone started   Recently son with   Hernia , fegley   Asthma ,pulmonary md jori   Controlled with fensenra     Soc hx      children   Radha kimbrough   Non smoker   Neg etoh   [employer]   Active ski   [employer] , bus manager         Rash  This is  "a new problem. The current episode started in the past 7 days. The rash is characterized by redness and burning. Pertinent negatives include no congestion, cough, diarrhea, fever, rhinorrhea, shortness of breath, sore throat or vomiting. His past medical history is significant for asthma. There were sick contacts at home.     Review of Systems   Constitutional:  Negative for appetite change, chills, fever and unexpected weight change.   HENT:  Negative for congestion, dental problem, ear pain, hearing loss, postnasal drip, rhinorrhea, sinus pressure, sinus pain, sneezing, sore throat, tinnitus and voice change.    Eyes:  Negative for visual disturbance.   Respiratory:  Negative for apnea, cough, chest tightness and shortness of breath.    Cardiovascular:  Negative for chest pain, palpitations and leg swelling.   Gastrointestinal:  Negative for abdominal pain, blood in stool, constipation, diarrhea, nausea and vomiting.   Endocrine: Negative for cold intolerance, heat intolerance, polydipsia, polyphagia and polyuria.   Genitourinary:  Negative for decreased urine volume, difficulty urinating, dysuria, frequency and hematuria.   Musculoskeletal:  Negative for arthralgias, back pain, gait problem, joint swelling and myalgias.   Skin:  Positive for rash. Negative for color change and wound.   Allergic/Immunologic: Negative for environmental allergies and food allergies.   Neurological:  Negative for dizziness, syncope, weakness, light-headedness, numbness and headaches.   Hematological:  Negative for adenopathy. Does not bruise/bleed easily.   Psychiatric/Behavioral:  Negative for sleep disturbance and suicidal ideas. The patient is not nervous/anxious.        Objective   /80 (BP Location: Left arm, Patient Position: Sitting)   Pulse 70   Resp 18   Ht 5' 11\" (1.803 m)   Wt 98.9 kg (218 lb)   SpO2 98%   BMI 30.40 kg/m²      Physical Exam  Constitutional:       General: He is not in acute distress.     " Appearance: He is not ill-appearing or toxic-appearing.   HENT:      Head: Normocephalic and atraumatic.   Eyes:      Conjunctiva/sclera: Conjunctivae normal.   Cardiovascular:      Rate and Rhythm: Normal rate.      Pulses: Normal pulses.   Pulmonary:      Effort: Pulmonary effort is normal.   Abdominal:      Tenderness: There is no right CVA tenderness or left CVA tenderness.      Hernia: A hernia is present. Hernia is present in the umbilical area and right inguinal area.   Skin:     Findings: Rash present.             Comments: Grouped vesicles clear, linear pattern following     Neurological:      Mental Status: He is oriented to person, place, and time.

## 2025-03-21 DIAGNOSIS — K42.9 UMBILICAL HERNIA: ICD-10-CM

## 2025-03-21 DIAGNOSIS — M10.9 GOUT: ICD-10-CM

## 2025-03-21 NOTE — TELEPHONE ENCOUNTER
Pt called refill line and stated that he is is a lot of pain from the hernia and the shingles and would like another script sent to the pharmacy    Reason for call:   [x] Refill   [] Prior Auth  [] Other:     Office:   [x] PCP/Provider -   [] Specialty/Provider -     Medication: oxyCODONE (Roxicodone) 5 immediate release tablet Take 1 tablet (5 mg total) by mouth every 4 (four) hours as needed for moderate pain       Pharmacy: [id number] - [place] - [address]     Local Pharmacy   Does the patient have enough for 3 days?   [] Yes   [x] No - Send as HP to POD    Mail Away Pharmacy   Does the patient have enough for 10 days?   [] Yes   [] No - Send as HP to POD

## 2025-03-21 NOTE — TELEPHONE ENCOUNTER
Patient called to request a refill for their oxycodone advised a refill was requested on 3/21 and is pending approval. Patient verbalized understanding and is in agreement.     Does the patient have enough for 3 days?   [] Yes   [x] No - Send as HP to POD   Pt is asking if the script can be sent asap and he'd like someone in the office to call him back once it's been sent

## 2025-03-24 RX ORDER — OXYCODONE HYDROCHLORIDE 5 MG/1
5 TABLET ORAL EVERY 4 HOURS PRN
Qty: 10 TABLET | Refills: 0 | Status: SHIPPED | OUTPATIENT
Start: 2025-03-24 | End: 2025-04-03

## 2025-03-27 ENCOUNTER — APPOINTMENT (EMERGENCY)
Dept: CT IMAGING | Facility: HOSPITAL | Age: 55
End: 2025-03-27
Payer: COMMERCIAL

## 2025-03-27 ENCOUNTER — NURSE TRIAGE (OUTPATIENT)
Age: 55
End: 2025-03-27

## 2025-03-27 ENCOUNTER — HOSPITAL ENCOUNTER (EMERGENCY)
Facility: HOSPITAL | Age: 55
Discharge: HOME | End: 2025-03-27
Attending: EMERGENCY MEDICINE
Payer: COMMERCIAL

## 2025-03-27 VITALS
HEART RATE: 73 BPM | DIASTOLIC BLOOD PRESSURE: 76 MMHG | OXYGEN SATURATION: 96 % | SYSTOLIC BLOOD PRESSURE: 131 MMHG | TEMPERATURE: 98.5 F | RESPIRATION RATE: 16 BRPM

## 2025-03-27 DIAGNOSIS — R74.8 ELEVATED LIPASE: ICD-10-CM

## 2025-03-27 DIAGNOSIS — K80.20 CHOLELITHIASIS: Primary | ICD-10-CM

## 2025-03-27 DIAGNOSIS — D72.829 LEUKOCYTOSIS: ICD-10-CM

## 2025-03-27 DIAGNOSIS — K40.90 INGUINAL HERNIA: ICD-10-CM

## 2025-03-27 LAB
ALBUMIN SERPL BCG-MCNC: 4.4 G/DL (ref 3.5–5)
ALP SERPL-CCNC: 92 U/L (ref 34–104)
ALT SERPL W P-5'-P-CCNC: 11 U/L (ref 7–52)
ANION GAP SERPL CALCULATED.3IONS-SCNC: 7 MMOL/L (ref 4–13)
AST SERPL W P-5'-P-CCNC: 14 U/L (ref 5–45)
BASOPHILS # BLD MANUAL: 0 THOUSAND/UL (ref 0–0.1)
BASOPHILS NFR MAR MANUAL: 0 % (ref 0–1)
BILIRUB SERPL-MCNC: 0.7 MG/DL (ref 0.2–1)
BUN SERPL-MCNC: 18 MG/DL (ref 5–25)
CALCIUM SERPL-MCNC: 10.1 MG/DL (ref 8.4–10.2)
CHLORIDE SERPL-SCNC: 103 MMOL/L (ref 96–108)
CO2 SERPL-SCNC: 29 MMOL/L (ref 21–32)
CREAT SERPL-MCNC: 0.91 MG/DL (ref 0.6–1.3)
EOSINOPHIL # BLD MANUAL: 0 THOUSAND/UL (ref 0–0.4)
EOSINOPHIL NFR BLD MANUAL: 0 % (ref 0–6)
ERYTHROCYTE [DISTWIDTH] IN BLOOD BY AUTOMATED COUNT: 14.4 % (ref 11.6–15.1)
GLUCOSE SERPL-MCNC: 72 MG/DL (ref 65–140)
HCT VFR BLD AUTO: 45.8 % (ref 36.5–46.1)
HGB BLD-MCNC: 15.3 G/DL (ref 12–15.4)
LIPASE SERPL-CCNC: 231 U/L (ref 11–82)
LYMPHOCYTES # BLD AUTO: 46 % (ref 14–44)
LYMPHOCYTES # BLD AUTO: 5.95 THOUSAND/UL (ref 0.6–4.47)
MCH RBC QN AUTO: 29.1 PG (ref 26.8–34.3)
MCHC RBC AUTO-ENTMCNC: 33.4 G/DL (ref 31.4–37.4)
MCV RBC AUTO: 87 FL (ref 82–98)
MONOCYTES # BLD AUTO: 1.16 THOUSAND/UL (ref 0–1.22)
MONOCYTES NFR BLD: 9 % (ref 4–12)
NEUTROPHILS # BLD MANUAL: 5.82 THOUSAND/UL (ref 1.85–7.62)
NEUTS SEG NFR BLD AUTO: 45 % (ref 43–75)
PLATELET # BLD AUTO: 256 THOUSANDS/UL (ref 149–390)
PLATELET BLD QL SMEAR: ADEQUATE
PMV BLD AUTO: 8.8 FL (ref 8.9–12.7)
POTASSIUM SERPL-SCNC: 3.9 MMOL/L (ref 3.5–5.3)
PROT SERPL-MCNC: 7.4 G/DL (ref 6.4–8.4)
RBC # BLD AUTO: 5.25 MILLION/UL (ref 3.88–5.12)
RBC MORPH BLD: NORMAL
SODIUM SERPL-SCNC: 139 MMOL/L (ref 135–147)
WBC # BLD AUTO: 12.94 THOUSAND/UL (ref 4.31–10.16)

## 2025-03-27 PROCEDURE — 96374 THER/PROPH/DIAG INJ IV PUSH: CPT

## 2025-03-27 PROCEDURE — 83690 ASSAY OF LIPASE: CPT

## 2025-03-27 PROCEDURE — 85007 BL SMEAR W/DIFF WBC COUNT: CPT

## 2025-03-27 PROCEDURE — 80053 COMPREHEN METABOLIC PANEL: CPT

## 2025-03-27 PROCEDURE — 99285 EMERGENCY DEPT VISIT HI MDM: CPT | Performed by: EMERGENCY MEDICINE

## 2025-03-27 PROCEDURE — 36415 COLL VENOUS BLD VENIPUNCTURE: CPT

## 2025-03-27 PROCEDURE — 74177 CT ABD & PELVIS W/CONTRAST: CPT

## 2025-03-27 PROCEDURE — 96375 TX/PRO/DX INJ NEW DRUG ADDON: CPT

## 2025-03-27 PROCEDURE — 85027 COMPLETE CBC AUTOMATED: CPT

## 2025-03-27 PROCEDURE — 99283 EMERGENCY DEPT VISIT LOW MDM: CPT

## 2025-03-27 RX ORDER — ONDANSETRON 2 MG/ML
4 INJECTION INTRAMUSCULAR; INTRAVENOUS ONCE
Status: COMPLETED | OUTPATIENT
Start: 2025-03-27 | End: 2025-03-27

## 2025-03-27 RX ADMIN — IOHEXOL 100 ML: 350 INJECTION, SOLUTION INTRAVENOUS at 21:29

## 2025-03-27 RX ADMIN — MORPHINE SULFATE 2 MG: 2 INJECTION, SOLUTION INTRAMUSCULAR; INTRAVENOUS at 19:50

## 2025-03-27 RX ADMIN — ONDANSETRON 4 MG: 2 INJECTION INTRAMUSCULAR; INTRAVENOUS at 19:47

## 2025-03-27 NOTE — Clinical Note
Deepak Aparicio was seen and treated in our emergency department on 3/27/2025.                Diagnosis:     Deepak  may return to work on return date.    He may return on this date: 03/31/2025         If you have any questions or concerns, please don't hesitate to call.      Sol Shukla, DO    ______________________________           _______________          _______________  Hospital Representative                              Date                                Time

## 2025-03-27 NOTE — ED PROVIDER NOTES
Time reflects when diagnosis was documented in both MDM as applicable and the Disposition within this note       Time User Action Codes Description Comment    3/27/2025  9:51 PM Sol Shukla Add [K80.20] Cholelithiasis     3/27/2025  9:53 PM Sol Shukla Add [K40.90] Inguinal hernia     3/27/2025  9:53 PM Sol Shukla Modify [K40.90] Inguinal hernia fat containing    3/27/2025 10:05 PM Maynor Adams Add [R74.8] Elevated lipase     3/27/2025 10:05 PM Maynor Adams Add [D72.829] Leukocytosis           ED Disposition       ED Disposition   Discharge    Condition   Stable    Date/Time   Thu Mar 27, 2025  9:52 PM    Comment   Deepak Aparicio discharge to home/self care.                   Assessment & Plan       Medical Decision Making  Amount and/or Complexity of Data Reviewed  Labs: ordered. Decision-making details documented in ED Course.  Radiology: ordered. Decision-making details documented in ED Course.    Risk  Prescription drug management.    54 yo M presented to ED for R groin pain. Associated symptoms: abd pain, nausea, shingles rash. Exam findings: mild diffuse abd pain, R inguinal hernia, healing shingles rash to R groin.      Differentials diagnoses considered: incarcerated hernia vs pancreatitis vs viral syndrome vs pain from shingles vs other.     See ED course for more details on lab and imaging interpretation, as well as pertinent information that occurred during patient's ED stay.  Based on these results and H&P,fat containing hernia, cholelithiasis. Results and clinical impressions were discussed with patient and family. They expressed understanding. Plan: d/c home with instructions to f/up with PCP, gen surgery, instructed to RTED for any new no worsening symptoms. This plan was also discussed with patient, who was agreeable with this plan. Patient was given the opportunity to ask questions in ED. All questions and concerns were addressed in ED.     This document was created using speech  voice recognition software.   Grammatical errors, random word insertions, pronoun errors, and incomplete sentences are an occasional consequence of this system due to software limitations, ambient noise, and hardware issues.   Any formal questions or concerns about content, text, or information contained within the body of this dictation should be directly addressed to the provider for clarification.     ED Course as of 03/27/25 2341   Thu Mar 27, 2025   2007 CBC and differential(!)  Mild leukocytosis.  Elevated RBCs.  Hemoglobin hematocrit within normal limits.  Platelets within normal limits.   2025 LIPASE(!): 231  elevated   2026 Comprehensive metabolic panel  wnl   2100 Manual Differential(PHLEBS Do Not Order)(!)  No bandemia.   2153 CT abdomen pelvis with contrast  LOWER CHEST: No clinically significant abnormality in the visualized lower chest.     LIVER/BILIARY TREE: Unremarkable.     GALLBLADDER: Cholelithiasis without findings of acute cholecystitis.     SPLEEN: Unremarkable.     PANCREAS: Unremarkable.     ADRENAL GLANDS: Unremarkable.     KIDNEYS/URETERS: No hydronephrosis. Nonobstructing bilateral nephrolithiasis. Subcentimeter hypoattenuating renal lesion(s), too small to characterize but statistically likely benign, which do not warrant follow-up (Radiology June 2019).     STOMACH AND BOWEL: Colonic diverticulosis without findings of acute diverticulitis.     APPENDIX: No findings to suggest appendicitis.     ABDOMINOPELVIC CAVITY: No ascites. No pneumoperitoneum. No lymphadenopathy.     VESSELS: Unremarkable for patient's age.     PELVIS     REPRODUCTIVE ORGANS: Enlarged prostate.     URINARY BLADDER: Unremarkable.     ABDOMINAL WALL/INGUINAL REGIONS: Small fat-containing umbilical and right inguinal hernia.     BONES: No acute fracture or suspicious osseous lesion.     IMPRESSION:     No acute findings in the abdomen or pelvis.            Medications   morphine injection 2 mg (2 mg Intravenous Given  3/27/25 1950)   ondansetron (ZOFRAN) injection 4 mg (4 mg Intravenous Given 3/27/25 1947)   iohexol (OMNIPAQUE) 350 MG/ML injection (MULTI-DOSE) 100 mL (100 mL Intravenous Given 3/27/25 2129)       ED Risk Strat Scores                                                History of Present Illness       Chief Complaint   Patient presents with    Inguinal Hernia     Pt reports increased pain to R inguinal hernia. Pt endorses bruising around the site as well. + paraesthesias in the R leg. Pt also dealing w/ shingles flare up since last Sunday night. On Valtrex. All lesions scabbed over but causing increased pain. + shingles below hernia site. Last dose tylenol at noon 1 g.        Past Medical History:   Diagnosis Date    Asthma     Kidney stones       History reviewed. No pertinent surgical history.   Family History   Problem Relation Age of Onset    COPD Mother       Social History     Tobacco Use    Smoking status: Former     Types: Cigarettes    Smokeless tobacco: Never    Tobacco comments:     only smoked for two years      E-Cigarette/Vaping      E-Cigarette/Vaping Substances      I have reviewed and agree with the history as documented.     HPI  55-year-old M with h/o renal stones, asthma presenting to ED with R groin pain. Reports associated abd pain, nausea, shingles to R upper thigh.  Shingles rash improving, pain from rash also improving. Continues to have R groin pain. Also concerned his inguinal hernia has gotten bigger on R side. Scheduled an appointment with gen surg in April. Has also had some generalized mild abd and mild nausea but attributed that to shingles medications. Denies fever, chills, CP, SOB, vomiting, bowel/bladder changes, and any other sxs.      Review of Systems  ROS otherwise negative unless stated above.       Objective       ED Triage Vitals [03/27/25 1923]   Temperature Pulse Blood Pressure Respirations SpO2 Patient Position - Orthostatic VS   98.5 °F (36.9 °C) 80 (!) 186/90 16 97 % Lying       Temp Source Heart Rate Source BP Location FiO2 (%) Pain Score    Oral Monitor Left arm -- 7      Vitals      Date and Time Temp Pulse SpO2 Resp BP Pain Score FACES Pain Rating User   03/27/25 2200 -- 73 96 % -- -- -- -- KB   03/27/25 2030 -- 70 96 % -- 131/76 6 -- KB   03/27/25 1950 -- -- -- -- -- 7 --    03/27/25 1923 98.5 °F (36.9 °C) 80 97 % 16 186/90 7 --             Physical Exam  General appearance: resting comfortably, no acute distress   HENT: Normocephalic, atraumatic, hearing grossly intact, and mucous membranes moist   Eyes: Conjunctiva normal, PERRL, EOM intact   Lungs/Chest: Respirations even and unlabored, breath sounds normal  Cardiovascular: Normal rate, regular rhythm  Abdomen: soft, non-distended,, mild diffuse abd tenderness,   : bilateral inguinal hernias, mild R>L tenderness to R inguinal region. Scabbed Vesicular rash to R upper thigh across dermatoma that is consistent with healing shingles - rash just below inguinal hernia  Musculoskeletal: extremities normal, atraumatic, no cyanosis or edema   Pulses: radial / dorsalis pedis pulses palpable  Skin: warm and dry   Neurologic: Awake and alert, no apparent focal deficit  Psych: Mood consistent with affect       Results Reviewed       Procedure Component Value Units Date/Time    RBC Morphology Reflex Test [[id number]] Collected: 03/27/25 1955    Lab Status: Final result Specimen: Blood from Arm, Left Updated: 03/27/25 2101    CBC and differential [[id number]]  (Abnormal) Collected: 03/27/25 1955    Lab Status: Final result Specimen: Blood from Arm, Left Updated: 03/27/25 2059     WBC 12.94 Thousand/uL      RBC 5.25 Million/uL      Hemoglobin 15.3 g/dL      Hematocrit 45.8 %      MCV 87 fL      MCH 29.1 pg      MCHC 33.4 g/dL      RDW 14.4 %      MPV 8.8 fL      Platelets 256 Thousands/uL     Narrative:      This is an appended report.  These results have been appended to a previously verified report.    Manual Differential(PHLEBS Do Not  Order) [[id number]]  (Abnormal) Collected: 03/27/25 1955    Lab Status: Final result Specimen: Blood from Arm, Left Updated: 03/27/25 2059     Segmented % 45 %      Lymphocytes % 46 %      Monocytes % 9 %      Eosinophils % 0 %      Basophils % 0 %      Absolute Neutrophils 5.82 Thousand/uL      Absolute Lymphocytes 5.95 Thousand/uL      Absolute Monocytes 1.16 Thousand/uL      Absolute Eosinophils 0.00 Thousand/uL      Absolute Basophils 0.00 Thousand/uL      Total Counted --     RBC Morphology Normal     Platelet Estimate Adequate    Comprehensive metabolic panel [[id number]] Collected: 03/27/25 1955    Lab Status: Final result Specimen: Blood from Arm, Left Updated: 03/27/25 2024     Sodium 139 mmol/L      Potassium 3.9 mmol/L      Chloride 103 mmol/L      CO2 29 mmol/L      ANION GAP 7 mmol/L      BUN 18 mg/dL      Creatinine 0.91 mg/dL      Glucose 72 mg/dL      Calcium 10.1 mg/dL      AST 14 U/L      ALT 11 U/L      Alkaline Phosphatase 92 U/L      Total Protein 7.4 g/dL      Albumin 4.4 g/dL      Total Bilirubin 0.70 mg/dL      eGFR --    Narrative:      Notes:     1. eGFR calculation is only valid for adults 18 years and older.  2. EGFR calculation cannot be performed for patients who are transgender, non-binary, or whose legal sex, sex at birth, and gender identity differ.    Lipase [[id number]]  (Abnormal) Collected: 03/27/25 1955    Lab Status: Final result Specimen: Blood from Arm, Left Updated: 03/27/25 2024     Lipase 231 u/L             CT abdomen pelvis with contrast   Final Interpretation by Rima Aguayo MD (03/27 2144)      No acute findings in the abdomen or pelvis.         Workstation performed: [id number]             Procedures    ED Medication and Procedure Management   Prior to Admission Medications   Prescriptions Last Dose Informant Patient Reported? Taking?   Advair Diskus 500-50 MCG/DOSE inhaler   Yes No   Sig: INL 1 PUFF PO BID   Cholecalciferol (Vitamin D3) 1.25 MG (15604 UT) CAPS   Yes No    Sig: TK 1 C PO 1 TIME Q WK TAT   Colchicine 0.6 MG CAPS   Yes No   Fasenra subcutaneous injection   Yes No   Spiriva Respimat 2.5 MCG/ACT AERS inhaler   Yes No   Sig: INL 2 PFS PO D   ZOLMitriptan (ZOMIG) 5 MG tablet   Yes No   Sig: Take 5 mg by mouth once as needed for migraine   acetaminophen (TYLENOL) 500 mg tablet   No No   Sig: Take 2 tablets (1,000 mg total) by mouth every 6 (six) hours as needed for mild pain   albuterol (2.5 mg/3 mL) 0.083 % nebulizer solution   Yes No   Sig: USE 1 VIAL IN NEBULIZER QID PRN   allopurinol (ZYLOPRIM) 100 mg tablet   Yes No   Sig: TK 2 TS PO QAM AND 2 TS HS   amitriptyline (ELAVIL) 10 mg tablet   No No   Sig: Take 1 tablet (10 mg total) by mouth daily at bedtime   budesonide (PULMICORT) 0.5 mg/2 mL nebulizer solution   Yes No   Sig: VVN BID PRN   fluticasone (FLONASE) 50 mcg/act nasal spray   Yes No   Sig: SHAKE LQ AND U 2 SPRAYS IEN D   indomethacin (INDOCIN) 50 mg capsule   Yes No   Sig: TAKE 1 CAPSULE BY MOUTH EVERY 8 HOURS IF NEEDED DURING GOUT FLARE WITH FOOD   lisinopril (ZESTRIL) 5 mg tablet   Yes No   Sig: TK 1 T PO D   metFORMIN (GLUCOPHAGE) 500 mg tablet   Yes No   Sig: TK 2 TS PO BID   montelukast (SINGULAIR) 10 mg tablet   Yes No   Sig: TK 1 T PO QD   oxyCODONE (Roxicodone) 5 immediate release tablet   No No   Sig: Take 1 tablet (5 mg total) by mouth every 4 (four) hours as needed for moderate pain for up to 10 days Max Daily Amount: 30 mg   predniSONE 1 mg tablet   Yes No   Sig: TK 3 TS PO Q 8 H PRN   predniSONE 10 mg tablet   Yes No   theophylline (UNIPHYL) 400 mg 24 hr tablet   Yes No   Sig: TK 1 T PO BID      Facility-Administered Medications: None     Discharge Medication List as of 3/27/2025  9:55 PM        CONTINUE these medications which have NOT CHANGED    Details   acetaminophen (TYLENOL) 500 mg tablet Take 2 tablets (1,000 mg total) by mouth every 6 (six) hours as needed for mild pain, Starting Fri 3/14/2025, Normal      Advair Diskus 500-50 MCG/DOSE  inhaler INL 1 PUFF PO BID, Historical Med      albuterol (2.5 mg/3 mL) 0.083 % nebulizer solution USE 1 VIAL IN NEBULIZER QID PRN, Historical Med      allopurinol (ZYLOPRIM) 100 mg tablet TK 2 TS PO QAM AND 2 TS HS, Historical Med      amitriptyline (ELAVIL) 10 mg tablet Take 1 tablet (10 mg total) by mouth daily at bedtime, Starting Tue 3/18/2025, Normal      budesonide (PULMICORT) 0.5 mg/2 mL nebulizer solution VVN BID PRN, Historical Med      Cholecalciferol (Vitamin D3) 1.25 MG (47425 UT) CAPS TK 1 C PO 1 TIME Q WK TAT, Historical Med      Colchicine 0.6 MG CAPS Starting Tue 7/21/2020, Historical Med      Fasenra subcutaneous injection Starting Mon 8/3/2020, Historical Med      fluticasone (FLONASE) 50 mcg/act nasal spray SHAKE LQ AND U 2 SPRAYS IEN D, Historical Med      indomethacin (INDOCIN) 50 mg capsule TAKE 1 CAPSULE BY MOUTH EVERY 8 HOURS IF NEEDED DURING GOUT FLARE WITH FOOD, Historical Med      lisinopril (ZESTRIL) 5 mg tablet TK 1 T PO D, Historical Med      metFORMIN (GLUCOPHAGE) 500 mg tablet TK 2 TS PO BID, Historical Med      montelukast (SINGULAIR) 10 mg tablet TK 1 T PO QD, Historical Med      oxyCODONE (Roxicodone) 5 immediate release tablet Take 1 tablet (5 mg total) by mouth every 4 (four) hours as needed for moderate pain for up to 10 days Max Daily Amount: 30 mg, Starting Mon 3/24/2025, Until Thu 4/3/2025 at 2359, Normal      !! predniSONE 1 mg tablet TK 3 TS PO Q 8 H PRN, Historical Med      !! predniSONE 10 mg tablet Starting Wed 7/22/2020, Historical Med      Spiriva Respimat 2.5 MCG/ACT AERS inhaler INL 2 PFS PO D, Historical Med      theophylline (UNIPHYL) 400 mg 24 hr tablet TK 1 T PO BID, Historical Med      ZOLMitriptan (ZOMIG) 5 MG tablet Take 5 mg by mouth once as needed for migraine, Historical Med       !! - Potential duplicate medications found. Please discuss with provider.          ED SEPSIS DOCUMENTATION   Time reflects when diagnosis was documented in both MDM as applicable  and the Disposition within this note       Time User Action Codes Description Comment    3/27/2025  9:51 PM Sol Shukla Add [K80.20] Cholelithiasis     3/27/2025  9:53 PM Sol Shukla Add [K40.90] Inguinal hernia     3/27/2025  9:53 PM Sol Shukla Modify [K40.90] Inguinal hernia fat containing    3/27/2025 10:05 PM Maynor Adams Add [R74.8] Elevated lipase     3/27/2025 10:05 PM Maynor Adams Add [D72.829] Leukocytosis                  Sol Shukla DO  03/27/25 1718

## 2025-03-27 NOTE — TELEPHONE ENCOUNTER
"FOLLOW UP: ED    REASON FOR CONVERSATION: Hernia    SYMPTOMS: Swollen testicle, discoloration in testicle and hernia, darker and tender    OTHER: Pep warm tx pt to CTS, pt reports his hernia has become more tender to the touch, darker in color and larger. Pt states the hernia was size of golf ball, now size of baseball. Pt denies fever or abdominal pain but states the hernia region is tender on a scale of 6 out of 10.  Pt states his wife says his testicle is darker in color in comparison to the other one. Pt is also dealing with shingles at this time, as well.  Pt would like to know if PCP could advise him on how long he is contagious with the shingles.    Per protocol, triage nurse further advise pt to go to the ER. Pt verbalized he will go.    PCP please follow up with pt and also Pt is requesting if you can inform Dr.Andrew Garber about pts status with his hernia as well.      DISPOSITION: Go to ED Now    Reason for Disposition   Hernia is painful or tender to touch    Answer Assessment - Initial Assessment Questions  1. ONSET:  \"When did this first appear?\"      Last two weeks but worse in last 24hrs  2. APPEARANCE: \"What does it look like?\"      TENDER AND LARGER -DARKER IN COLOR  3. SIZE: \"How big is it?\" (inches, cm or compare to coins, fruit)      BASEBALL SIZE WAS A GOLF BALL   4. LOCATION: \"Where exactly is the hernia located?\"       RIGHT ABD REGION   5. PATTERN: \"Does the swelling come and go, or has it been constant since it started?\"      Swelling is constant   6. PAIN: \"Is there any pain?\" If Yes, ask: \"How bad is it?\"  (Scale 1-10; or mild, moderate, severe)      Tender- 6-taking over counter   7. DIAGNOSIS: \"Have you been seen by a doctor (or NP/PA) for this?\" \"Did the doctor diagnose you as having a hernia?\"      YES  8. OTHER SYMPTOMS: \"Do you have any other symptoms?\" (e.g., fever, abdomen pain, vomiting)      denies  9. PREGNANCY: \"Is there any chance you are pregnant?\" \"When was your last " "menstrual period?\"      N/A    Protocols used: Hernia-Adult-[place]    "

## 2025-03-28 NOTE — ED ATTENDING ATTESTATION
3/27/2025  IMaynor DO, saw and evaluated the patient. I have discussed the patient with the resident/non-physician practitioner and agree with the resident's/non-physician practitioner's findings, Plan of Care, and MDM as documented in the resident's/non-physician practitioner's note, except where noted. All available labs and Radiology studies were reviewed.  I was present for key portions of any procedure(s) performed by the resident/non-physician practitioner and I was immediately available to provide assistance.       At this point I agree with the current assessment done in the Emergency Department.  I have conducted an independent evaluation of this patient a history and physical is as follows: 55-year-old male, past medical history recent right medial thigh and inguinal crease zoster, known right inguinal hernia, presenting to the emergency department for enlarged right inguinal hernia.  Additionally patient wife state that there was a leigh appearing aspect to his right testicle congruent with the hernia.  He denies any vomiting, diarrhea, abdominal pain otherwise.  Denies fever, chills, testicular pain.  Notes compliant on antiviral and supportive medications via PCP.    Vital signs stable.  Patient resting comfortably.  Lungs clear to auscultation.  No increased work of breathing.  Heart regular rhythm.  Abdomen soft nontender including to palpation of the epigastrium.  On resident evaluation, right inguinal hernia present per report.  Patient was put in Trendelenburg with ice pack over site hernia and when I returned to the room the hernia was absent.  No inguinal, abdominal including umbilical hernias present otherwise.  RN chaperone present and penis, testicles without any masses, tenderness to palpation.  No penile discharge.  No overlying skin changes to testicles.  Both descended.  Overlying proximal L1 dermatome multiple lesions some scabbed, some open and ulcerated.  No perineal tenderness  or skin change.    MDM: 55-year-old male presenting to the emergency department with known right inguinal hernia with report of hernia being symptomatic and present initially in the emergency department but ultimately only minutes after resident evaluation the hernia had self resolved without manipulation and only Trendelenburg and ice.  In discussion with patient wife it is unclear as to whether it was ever near incarceration as they both state that it does freely come and go at baseline but it was the skin changes and degree of protrusion that caused her presentation.  The skin changes may be secondary to the zoster that is encroaching in the inguinal space that when pronounced via the hernia appeared to be overlying.  The testicles and penis themselves are without acute abnormality.  CBC with mild leukocytosis but patient without fever, chills, lesions in inguinal area are without any sign of active bacterial infection without erythema, induration.  No sign of bacterial infection otherwise.  As a pertains to the mild elevation in lipase, patient without signs or symptoms of pancreatitis at this time.  Recommended for both leukocytosis and lipase the patient follow-up with primary care for interval lab evaluation and explained signs symptoms of pancreatitis should patient begin to develop them to return to the emergency department.  Patient also with asymptomatic cholelithiasis.  Gross of the both for the now symptomatic inguinal hernia as well as the cholelithiasis the patient follow-up with general surgery.  Ambulatory referral provided to their service. Reviewed all findings both relevant and incidental with the patient at bedside. Pt verbalized understanding of findings, neccesary follow up, return to ED precautions. Pt agreed to review today's findings with their primary care provider. Pt non-toxic appearing upon discharge.       ED Course         Critical Care Time  Procedures

## 2025-04-10 ENCOUNTER — OFFICE VISIT (OUTPATIENT)
Dept: FAMILY MEDICINE CLINIC | Facility: CLINIC | Age: 55
End: 2025-04-10
Payer: COMMERCIAL

## 2025-04-10 VITALS
WEIGHT: 213 LBS | DIASTOLIC BLOOD PRESSURE: 60 MMHG | BODY MASS INDEX: 29.82 KG/M2 | HEIGHT: 71 IN | OXYGEN SATURATION: 100 % | SYSTOLIC BLOOD PRESSURE: 110 MMHG | HEART RATE: 92 BPM

## 2025-04-10 DIAGNOSIS — R19.7 DIARRHEA, UNSPECIFIED TYPE: Primary | ICD-10-CM

## 2025-04-10 DIAGNOSIS — K21.9 GASTROESOPHAGEAL REFLUX DISEASE WITHOUT ESOPHAGITIS: ICD-10-CM

## 2025-04-10 DIAGNOSIS — E11.9 TYPE 2 DIABETES MELLITUS WITHOUT COMPLICATION, WITHOUT LONG-TERM CURRENT USE OF INSULIN (HCC): ICD-10-CM

## 2025-04-10 DIAGNOSIS — R10.10 PAIN OF UPPER ABDOMEN: ICD-10-CM

## 2025-04-10 PROCEDURE — 99214 OFFICE O/P EST MOD 30 MIN: CPT | Performed by: FAMILY MEDICINE

## 2025-04-10 RX ORDER — TIRZEPATIDE 15 MG/.5ML
INJECTION, SOLUTION SUBCUTANEOUS
COMMUNITY

## 2025-04-10 RX ORDER — CELECOXIB 200 MG/1
CAPSULE ORAL
COMMUNITY
End: 2025-04-15

## 2025-04-10 RX ORDER — VALACYCLOVIR HYDROCHLORIDE 1 G/1
TABLET, FILM COATED ORAL
COMMUNITY
Start: 2025-03-16

## 2025-04-10 RX ORDER — OMEPRAZOLE 20 MG/1
20 CAPSULE, DELAYED RELEASE ORAL DAILY
Qty: 30 CAPSULE | Refills: 3 | Status: SHIPPED | OUTPATIENT
Start: 2025-04-10

## 2025-04-10 RX ORDER — TADALAFIL 5 MG/1
TABLET ORAL
COMMUNITY

## 2025-04-10 RX ORDER — LORATADINE 10 MG/1
TABLET ORAL
COMMUNITY

## 2025-04-10 RX ORDER — FEXOFENADINE HCL 180 MG/1
180 TABLET ORAL DAILY
COMMUNITY

## 2025-04-10 NOTE — PROGRESS NOTES
Name: Deepak Aparicio      : 1970      MRN: [id number]  Encounter Provider: JASON Colby  Encounter Date: 4/10/2025   Encounter department: [facility] [address]  :  Assessment & Plan  Diarrhea, unspecified type  Discussed potentials with patient, stressed importance of dietary modifications avoidance and  Orders:    Ambulatory referral to Gastroenterology; Future    CBC and differential; Future    Amylase; Future    Lipase; Future    Comprehensive metabolic panel; Future    Pain of upper abdomen  Discussed potential patient not presently, reviewed evaluation, recommend further eval GI  Orders:    Ambulatory referral to Gastroenterology; Future    CBC and differential; Future    Amylase; Future    Lipase; Future    Comprehensive metabolic panel; Future    Gastroesophageal reflux disease without esophagitis    Orders:    omeprazole (PriLOSEC) 20 mg delayed release capsule; Take 1 capsule (20 mg total) by mouth daily    Type 2 diabetes mellitus without complication, without long-term current use of insulin (HCC)  Continue well-controlled without   Lab Results   Component Value Date    HGBA1C 4.6 2025       Orders:    Albumin / creatinine urine ratio; Future    Hemoglobin A1C; Future           History of Present Illness   F/u   hosp  Persistent diarrhea over the course the past month denies any significant changes in intake of excess alcohol  Negative travel negative ill contact  Negative supplements  Denies fever chills nausea vomiting denies blood or mucus in stool  Hernia ing approx 10yr , appt with surgeon upcoming   Shingles, rash active lesions essentially dry without residual tenderness    Diarrhea   This is a chronic problem. The current episode started 1 to 4 weeks ago. Pertinent negatives include no abdominal pain, arthralgias, chills, coughing, fever, headaches, myalgias or vomiting. Nothing aggravates the symptoms.     Review of Systems  "  Constitutional:  Negative for appetite change, chills, fever and unexpected weight change.   HENT:  Negative for congestion, dental problem, ear pain, hearing loss, postnasal drip, rhinorrhea, sinus pressure, sinus pain, sneezing, sore throat, tinnitus and voice change.    Eyes:  Negative for visual disturbance.   Respiratory:  Negative for apnea, cough, chest tightness and shortness of breath.    Cardiovascular:  Negative for chest pain, palpitations and leg swelling.   Gastrointestinal:  Positive for diarrhea. Negative for abdominal pain, blood in stool, constipation, nausea and vomiting.   Endocrine: Negative for cold intolerance, heat intolerance, polydipsia, polyphagia and polyuria.   Genitourinary:  Negative for decreased urine volume, difficulty urinating, dysuria, frequency and hematuria.   Musculoskeletal:  Negative for arthralgias, back pain, gait problem, joint swelling and myalgias.   Skin:  Negative for color change, rash and wound.   Allergic/Immunologic: Negative for environmental allergies and food allergies.   Neurological:  Negative for dizziness, syncope, weakness, light-headedness, numbness and headaches.   Hematological:  Negative for adenopathy. Does not bruise/bleed easily.   Psychiatric/Behavioral:  Negative for sleep disturbance and suicidal ideas. The patient is not nervous/anxious.        Objective   /60 (BP Location: Left arm, Patient Position: Sitting, Cuff Size: Large)   Pulse 92   Ht 5' 11\" (1.803 m)   Wt 96.6 kg (213 lb)   SpO2 100%   BMI 29.71 kg/m²      Physical Exam  Constitutional:       General: He is not in acute distress.     Appearance: He is well-developed. He is not ill-appearing or toxic-appearing.   HENT:      Head: Normocephalic and atraumatic.   Cardiovascular:      Rate and Rhythm: Normal rate and regular rhythm.      Pulses: Normal pulses.      Heart sounds: Normal heart sounds. No murmur heard.  Pulmonary:      Effort: Pulmonary effort is normal.      " Breath sounds: Normal breath sounds.   Musculoskeletal:         General: Normal range of motion.      Cervical back: Normal range of motion and neck supple.   Skin:     General: Skin is warm and dry.   Neurological:      Mental Status: He is alert and oriented to person, place, and time.      Deep Tendon Reflexes: Reflexes are normal and symmetric.   Psychiatric:         Behavior: Behavior normal.         Thought Content: Thought content normal.         Judgment: Judgment normal.

## 2025-04-14 NOTE — ASSESSMENT & PLAN NOTE
Continue well-controlled without   Lab Results   Component Value Date    HGBA1C 4.6 01/28/2025       Orders:    Albumin / creatinine urine ratio; Future    Hemoglobin A1C; Future

## 2025-04-15 ENCOUNTER — OFFICE VISIT (OUTPATIENT)
Dept: SURGERY | Facility: CLINIC | Age: 55
End: 2025-04-15
Payer: COMMERCIAL

## 2025-04-15 VITALS
DIASTOLIC BLOOD PRESSURE: 70 MMHG | RESPIRATION RATE: 18 BRPM | TEMPERATURE: 97.5 F | SYSTOLIC BLOOD PRESSURE: 122 MMHG | BODY MASS INDEX: 30.88 KG/M2 | WEIGHT: 220.6 LBS | OXYGEN SATURATION: 96 % | HEART RATE: 68 BPM | HEIGHT: 71 IN

## 2025-04-15 DIAGNOSIS — K40.90 RIGHT INGUINAL HERNIA: Primary | ICD-10-CM

## 2025-04-15 DIAGNOSIS — K80.20 CALCULUS OF GALLBLADDER WITHOUT CHOLECYSTITIS WITHOUT OBSTRUCTION: ICD-10-CM

## 2025-04-15 PROCEDURE — 99204 OFFICE O/P NEW MOD 45 MIN: CPT | Performed by: STUDENT IN AN ORGANIZED HEALTH CARE EDUCATION/TRAINING PROGRAM

## 2025-04-15 RX ORDER — CHLORHEXIDINE GLUCONATE 40 MG/ML
SOLUTION TOPICAL DAILY PRN
OUTPATIENT
Start: 2025-04-15

## 2025-04-15 RX ORDER — HEPARIN SODIUM 5000 [USP'U]/ML
5000 INJECTION, SOLUTION INTRAVENOUS; SUBCUTANEOUS ONCE
OUTPATIENT
Start: 2025-04-15 | End: 2025-04-15

## 2025-04-15 NOTE — ASSESSMENT & PLAN NOTE
55-year-old male with reducible right inguinal hernia  -See HPI  - On exam there is a reducible right inguinal hernia  -CBC and CMP from 3/27/2025 reviewed  - Hemoglobin A1c from 1/28/2025 reviewed, noted to be 4.6  -CT scan of the abdomen and pelvis from 3/27/2025 report and images reviewed  - Plan for laparoscopic right inguinal hernia pair with mesh, possible open, possible bilateral    A visual was used to display the anatomy and the proposed incision, procedure, steps, and mesh placement. The risks were explained at length and outlined, not limited to bleeding, infection, recurrence, pain, testicular loss, and damage to other structures. The planned approximately one hour procedure was discussed along with 1 - 2 week recovery, resolution of pain and swelling timeline, and 4 weeks of light duty without lifting. Questions were answered to satisfaction and consent was signed.    Orders:    Case request operating room: REPAIR HERNIA INGUINAL, LAPAROSCOPIC, Possible Open, Possible Bilateral, CHOLECYSTECTOMY LAPAROSCOPIC, Possible Open; Standing

## 2025-04-15 NOTE — H&P (VIEW-ONLY)
Name: Deepak Aparicio      : 1970      MRN: [id number]  Encounter Provider: Elliot Krueger DO  Encounter Date: 4/15/2025   Encounter department: [facility]  :  Assessment & Plan  Right inguinal hernia  55-year-old male with reducible right inguinal hernia  -See HPI  - On exam there is a reducible right inguinal hernia  -CBC and CMP from 3/27/2025 reviewed  - Hemoglobin A1c from 2025 reviewed, noted to be 4.6  -CT scan of the abdomen and pelvis from 3/27/2025 report and images reviewed  - Plan for laparoscopic right inguinal hernia pair with mesh, possible open, possible bilateral    A visual was used to display the anatomy and the proposed incision, procedure, steps, and mesh placement. The risks were explained at length and outlined, not limited to bleeding, infection, recurrence, pain, testicular loss, and damage to other structures. The planned approximately one hour procedure was discussed along with 1 - 2 week recovery, resolution of pain and swelling timeline, and 4 weeks of light duty without lifting. Questions were answered to satisfaction and consent was signed.    Orders:    Case request operating room: REPAIR HERNIA INGUINAL, LAPAROSCOPIC, Possible Open, Possible Bilateral, CHOLECYSTECTOMY LAPAROSCOPIC, Possible Open; Standing    Calculus of gallbladder without cholecystitis without obstruction  55-year-old male with cholelithiasis  -See HPI  -CBC and CMP from 3/27/2025 reviewed  - Hemoglobin A1c from 2025 reviewed, noted to be 4.6  -CT scan of the abdomen and pelvis from 3/27/2025 report and images reviewed  --Of note the patient's gallstone is about 3 cm  -Due to the size of the patient's gallstone recommend cholecystectomy, discussed that a gallstone at 3 cm increases risk of gallbladder cancer over time  - Plan for laparoscopic cholecystectomy, possible open    The risks and benefits of cholecystectomy were discussed and the plan for laparoscopic  cholecystectomy was outlined with the use a picture for visual aid.  We discussed the risks not limited to bleeding, infection, damage to other intra-abdominal structures, bile duct injury. We discussed the anticipated approach and incisions and the anticipated postoperative course. Patient's questions were answered to satisfaction and a consent form was signed.    Orders:    Case request operating room: REPAIR HERNIA INGUINAL, LAPAROSCOPIC, Possible Open, Possible Bilateral, CHOLECYSTECTOMY LAPAROSCOPIC, Possible Open; Standing        History of Present Illness   HPI  Deepak Aparicio is a 55 y.o. adult who presents for evaluation due to right inguinal hernia.  Patient notes a bulge in his right groin for some time.  Recently it has gotten larger and has started to cause some discomfort.  He has been tolerating a diet and having bowel function.  Patient also notes history of gallstones.  States he may have had symptoms from prior but has not had anything recent.  After review of imaging the gallstone is about 3 cm in size.  History obtained from: patient    Review of Systems   Constitutional:  Negative for chills, fatigue and fever.   HENT:  Negative for congestion, hearing loss, rhinorrhea and sore throat.    Eyes:  Negative for pain and discharge.   Respiratory:  Negative for cough, chest tightness and shortness of breath.    Cardiovascular:  Negative for chest pain and palpitations.   Gastrointestinal:  Negative for abdominal pain, constipation, diarrhea, nausea and vomiting.        Positive right groin bulge and discomfort   Endocrine: Negative for cold intolerance and heat intolerance.   Genitourinary:  Negative for difficulty urinating and dysuria.   Musculoskeletal:  Negative for back pain and neck pain.   Skin:  Negative for color change and rash.   Allergic/Immunologic: Negative for environmental allergies and food allergies.   Neurological:  Negative for seizures and headaches.   Hematological:  Does not  bruise/bleed easily.   Psychiatric/Behavioral:  Negative for confusion and hallucinations.      Medical History Reviewed by provider this encounter:  Tobacco  Allergies  Meds  Problems  Med Hx  Surg Hx  Fam Hx     .  Past Medical History   Past Medical History:   Diagnosis Date    Asthma     Kidney stones      Past Surgical History:   Procedure Laterality Date    APPENDECTOMY      BACK SURGERY      CYST REMOVAL      mouth    WISDOM TOOTH EXTRACTION       Family History   Problem Relation Age of Onset    COPD Mother     Cancer Mother     Asthma Mother     Cancer Father     Diabetes type II Father     No Known Problems Brother     No Known Problems Brother     No Known Problems Brother     No Known Problems Son     No Known Problems Son       reports that he has quit smoking. His smoking use included cigarettes. He has never used smokeless tobacco. He reports that he does not currently use drugs. He reports that he does not drink alcohol.  Current Outpatient Medications   Medication Instructions    acetaminophen (TYLENOL) 1,000 mg, Oral, Every 6 hours PRN    allopurinol (ZYLOPRIM) 100 mg tablet TK 2 TS PO QAM AND 2 TS HS    Cholecalciferol (Vitamin D3) 1.25 MG (64023 UT) CAPS TK 1 C PO 1 TIME Q WK TAT    Fasenra subcutaneous injection No dose, route, or frequency recorded.    fexofenadine (ALLEGRA) 180 mg, Oral, Daily    fluticasone (FLONASE) 50 mcg/act nasal spray SHAKE LQ AND U 2 SPRAYS IEN D    indomethacin (INDOCIN) 50 mg capsule TAKE 1 CAPSULE BY MOUTH EVERY 8 HOURS IF NEEDED DURING GOUT FLARE WITH FOOD    loratadine (CLARITIN) 10 mg tablet Take 1 tablet by mouth every morning Oral    Mounjaro 15 MG/0.5ML SOAJ ADMINISTER 15 MG UNDER THE SKIN 1 TIME A WEEK    omeprazole (PRILOSEC) 20 mg, Oral, Daily    tadalafil (CIALIS) 5 MG tablet TAKE 1 TABLET BY MOUTH 1 TIME 30 MINUTES BEFORE ACTIVITY    valACYclovir (VALTREX) 1,000 mg tablet TAKE 1 TABLET 3 TIMES A DAY FOR 10 DAYS    ZOLMitriptan (ZOMIG) 5 mg, Oral,  Once as needed   No Known Allergies   Current Outpatient Medications on File Prior to Visit   Medication Sig Dispense Refill    acetaminophen (TYLENOL) 500 mg tablet Take 2 tablets (1,000 mg total) by mouth every 6 (six) hours as needed for mild pain 60 tablet 0    allopurinol (ZYLOPRIM) 100 mg tablet TK 2 TS PO QAM AND 2 TS HS      Cholecalciferol (Vitamin D3) 1.25 MG (37350 UT) CAPS TK 1 C PO 1 TIME Q WK TAT      Fasenra subcutaneous injection       fexofenadine (ALLEGRA) 180 MG tablet Take 180 mg by mouth daily      fluticasone (FLONASE) 50 mcg/act nasal spray SHAKE LQ AND U 2 SPRAYS IEN D      indomethacin (INDOCIN) 50 mg capsule TAKE 1 CAPSULE BY MOUTH EVERY 8 HOURS IF NEEDED DURING GOUT FLARE WITH FOOD      loratadine (CLARITIN) 10 mg tablet Take 1 tablet by mouth every morning Oral      Mounjaro 15 MG/0.5ML SOAJ ADMINISTER 15 MG UNDER THE SKIN 1 TIME A WEEK      omeprazole (PriLOSEC) 20 mg delayed release capsule Take 1 capsule (20 mg total) by mouth daily 30 capsule 3    tadalafil (CIALIS) 5 MG tablet TAKE 1 TABLET BY MOUTH 1 TIME 30 MINUTES BEFORE ACTIVITY      valACYclovir (VALTREX) 1,000 mg tablet TAKE 1 TABLET 3 TIMES A DAY FOR 10 DAYS      ZOLMitriptan (ZOMIG) 5 MG tablet Take 5 mg by mouth once as needed for migraine      [DISCONTINUED] Advair Diskus 500-50 MCG/DOSE inhaler INL 1 PUFF PO BID (Patient not taking: Reported on 4/15/2025)      [DISCONTINUED] albuterol (2.5 mg/3 mL) 0.083 % nebulizer solution USE 1 VIAL IN NEBULIZER QID PRN (Patient not taking: Reported on 4/15/2025)      [DISCONTINUED] amitriptyline (ELAVIL) 10 mg tablet Take 1 tablet (10 mg total) by mouth daily at bedtime (Patient not taking: Reported on 4/15/2025) 30 tablet 2    [DISCONTINUED] budesonide (PULMICORT) 0.5 mg/2 mL nebulizer solution VVN BID PRN (Patient not taking: Reported on 4/15/2025)      [DISCONTINUED] celecoxib (CeleBREX) 200 mg capsule Take 1 capsule by mouth once a day as needed for pain Oral (Patient not taking:  "Reported on 4/15/2025)      [DISCONTINUED] Colchicine 0.6 MG CAPS  (Patient not taking: Reported on 4/15/2025)      [DISCONTINUED] lisinopril (ZESTRIL) 5 mg tablet TK 1 T PO D (Patient not taking: Reported on 4/15/2025)      [DISCONTINUED] metFORMIN (GLUCOPHAGE) 500 mg tablet TK 2 TS PO BID (Patient not taking: Reported on 4/15/2025)      [DISCONTINUED] montelukast (SINGULAIR) 10 mg tablet TK 1 T PO QD (Patient not taking: Reported on 4/10/2025)      [DISCONTINUED] predniSONE 1 mg tablet TK 3 TS PO Q 8 H PRN (Patient not taking: Reported on 4/10/2025)      [DISCONTINUED] predniSONE 10 mg tablet  (Patient not taking: Reported on 4/15/2025)      [DISCONTINUED] Spiriva Respimat 2.5 MCG/ACT AERS inhaler INL 2 PFS PO D (Patient not taking: Reported on 4/15/2025)       No current facility-administered medications on file prior to visit.      Social History     Tobacco Use    Smoking status: Former     Types: Cigarettes    Smokeless tobacco: Never    Tobacco comments:     only smoked for two years   Vaping Use    Vaping status: Never Used   Substance and Sexual Activity    Alcohol use: Never    Drug use: Not Currently    Sexual activity: Not on file        Objective   /70 (BP Location: Left arm, Patient Position: Sitting, Cuff Size: Standard)   Pulse 68   Temp 97.5 °F (36.4 °C)   Resp 18   Ht 5' 11\" (1.803 m)   Wt 100 kg (220 lb 9.6 oz)   SpO2 96%   BMI 30.77 kg/m²      Physical Exam  Constitutional:       Appearance: Normal appearance.   HENT:      Head: Normocephalic and atraumatic.      Nose: Nose normal.   Eyes:      General: No scleral icterus.     Conjunctiva/sclera: Conjunctivae normal.   Cardiovascular:      Rate and Rhythm: Normal rate and regular rhythm.      Heart sounds: Normal heart sounds.   Pulmonary:      Effort: Pulmonary effort is normal.      Breath sounds: Normal breath sounds.   Abdominal:      General: There is no distension.      Palpations: Abdomen is soft.      Tenderness: There is no " abdominal tenderness.      Comments: Reducible right inguinal hernia, no obvious inguinal hernia on the left   Musculoskeletal:         General: No signs of injury.   Skin:     General: Skin is warm.      Coloration: Skin is not jaundiced.   Neurological:      General: No focal deficit present.      Mental Status: He is alert and oriented to person, place, and time.   Psychiatric:         Mood and Affect: Mood normal.         Behavior: Behavior normal.

## 2025-04-15 NOTE — ASSESSMENT & PLAN NOTE
55-year-old male with cholelithiasis  -See HPI  -CBC and CMP from 3/27/2025 reviewed  - Hemoglobin A1c from 1/28/2025 reviewed, noted to be 4.6  -CT scan of the abdomen and pelvis from 3/27/2025 report and images reviewed  --Of note the patient's gallstone is about 3 cm  -Due to the size of the patient's gallstone recommend cholecystectomy, discussed that a gallstone at 3 cm increases risk of gallbladder cancer over time  - Plan for laparoscopic cholecystectomy, possible open    The risks and benefits of cholecystectomy were discussed and the plan for laparoscopic cholecystectomy was outlined with the use a picture for visual aid.  We discussed the risks not limited to bleeding, infection, damage to other intra-abdominal structures, bile duct injury. We discussed the anticipated approach and incisions and the anticipated postoperative course. Patient's questions were answered to satisfaction and a consent form was signed.    Orders:    Case request operating room: REPAIR HERNIA INGUINAL, LAPAROSCOPIC, Possible Open, Possible Bilateral, CHOLECYSTECTOMY LAPAROSCOPIC, Possible Open; Standing

## 2025-04-15 NOTE — PROGRESS NOTES
Name: Deepak Aparicio      : 1970      MRN: [id number]  Encounter Provider: Elliot Krueger DO  Encounter Date: 4/15/2025   Encounter department: [facility] SURG[facility]  :  Assessment & Plan  Right inguinal hernia  55-year-old male with reducible right inguinal hernia  -See HPI  - On exam there is a reducible right inguinal hernia  -CBC and CMP from 3/27/2025 reviewed  - Hemoglobin A1c from 2025 reviewed, noted to be 4.6  -CT scan of the abdomen and pelvis from 3/27/2025 report and images reviewed  - Plan for laparoscopic right inguinal hernia pair with mesh, possible open, possible bilateral    A visual was used to display the anatomy and the proposed incision, procedure, steps, and mesh placement. The risks were explained at length and outlined, not limited to bleeding, infection, recurrence, pain, testicular loss, and damage to other structures. The planned approximately one hour procedure was discussed along with 1 - 2 week recovery, resolution of pain and swelling timeline, and 4 weeks of light duty without lifting. Questions were answered to satisfaction and consent was signed.    Orders:    Case request operating room: REPAIR HERNIA INGUINAL, LAPAROSCOPIC, Possible Open, Possible Bilateral, CHOLECYSTECTOMY LAPAROSCOPIC, Possible Open; Standing    Calculus of gallbladder without cholecystitis without obstruction  55-year-old male with cholelithiasis  -See HPI  -CBC and CMP from 3/27/2025 reviewed  - Hemoglobin A1c from 2025 reviewed, noted to be 4.6  -CT scan of the abdomen and pelvis from 3/27/2025 report and images reviewed  --Of note the patient's gallstone is about 3 cm  -Due to the size of the patient's gallstone recommend cholecystectomy, discussed that a gallstone at 3 cm increases risk of gallbladder cancer over time  - Plan for laparoscopic cholecystectomy, possible open    The risks and benefits of cholecystectomy were discussed and the plan for laparoscopic  cholecystectomy was outlined with the use a picture for visual aid.  We discussed the risks not limited to bleeding, infection, damage to other intra-abdominal structures, bile duct injury. We discussed the anticipated approach and incisions and the anticipated postoperative course. Patient's questions were answered to satisfaction and a consent form was signed.    Orders:    Case request operating room: REPAIR HERNIA INGUINAL, LAPAROSCOPIC, Possible Open, Possible Bilateral, CHOLECYSTECTOMY LAPAROSCOPIC, Possible Open; Standing        History of Present Illness   HPI  Deepak Aparicio is a 55 y.o. adult who presents for evaluation due to right inguinal hernia.  Patient notes a bulge in his right groin for some time.  Recently it has gotten larger and has started to cause some discomfort.  He has been tolerating a diet and having bowel function.  Patient also notes history of gallstones.  States he may have had symptoms from prior but has not had anything recent.  After review of imaging the gallstone is about 3 cm in size.  History obtained from: patient    Review of Systems   Constitutional:  Negative for chills, fatigue and fever.   HENT:  Negative for congestion, hearing loss, rhinorrhea and sore throat.    Eyes:  Negative for pain and discharge.   Respiratory:  Negative for cough, chest tightness and shortness of breath.    Cardiovascular:  Negative for chest pain and palpitations.   Gastrointestinal:  Negative for abdominal pain, constipation, diarrhea, nausea and vomiting.        Positive right groin bulge and discomfort   Endocrine: Negative for cold intolerance and heat intolerance.   Genitourinary:  Negative for difficulty urinating and dysuria.   Musculoskeletal:  Negative for back pain and neck pain.   Skin:  Negative for color change and rash.   Allergic/Immunologic: Negative for environmental allergies and food allergies.   Neurological:  Negative for seizures and headaches.   Hematological:  Does not  bruise/bleed easily.   Psychiatric/Behavioral:  Negative for confusion and hallucinations.      Medical History Reviewed by provider this encounter:  Tobacco  Allergies  Meds  Problems  Med Hx  Surg Hx  Fam Hx     .  Past Medical History   Past Medical History:   Diagnosis Date    Asthma     Kidney stones      Past Surgical History:   Procedure Laterality Date    APPENDECTOMY      BACK SURGERY      CYST REMOVAL      mouth    WISDOM TOOTH EXTRACTION       Family History   Problem Relation Age of Onset    COPD Mother     Cancer Mother     Asthma Mother     Cancer Father     Diabetes type II Father     No Known Problems Brother     No Known Problems Brother     No Known Problems Brother     No Known Problems Son     No Known Problems Son       reports that he has quit smoking. His smoking use included cigarettes. He has never used smokeless tobacco. He reports that he does not currently use drugs. He reports that he does not drink alcohol.  Current Outpatient Medications   Medication Instructions    acetaminophen (TYLENOL) 1,000 mg, Oral, Every 6 hours PRN    allopurinol (ZYLOPRIM) 100 mg tablet TK 2 TS PO QAM AND 2 TS HS    Cholecalciferol (Vitamin D3) 1.25 MG (47398 UT) CAPS TK 1 C PO 1 TIME Q WK TAT    Fasenra subcutaneous injection No dose, route, or frequency recorded.    fexofenadine (ALLEGRA) 180 mg, Oral, Daily    fluticasone (FLONASE) 50 mcg/act nasal spray SHAKE LQ AND U 2 SPRAYS IEN D    indomethacin (INDOCIN) 50 mg capsule TAKE 1 CAPSULE BY MOUTH EVERY 8 HOURS IF NEEDED DURING GOUT FLARE WITH FOOD    loratadine (CLARITIN) 10 mg tablet Take 1 tablet by mouth every morning Oral    Mounjaro 15 MG/0.5ML SOAJ ADMINISTER 15 MG UNDER THE SKIN 1 TIME A WEEK    omeprazole (PRILOSEC) 20 mg, Oral, Daily    tadalafil (CIALIS) 5 MG tablet TAKE 1 TABLET BY MOUTH 1 TIME 30 MINUTES BEFORE ACTIVITY    valACYclovir (VALTREX) 1,000 mg tablet TAKE 1 TABLET 3 TIMES A DAY FOR 10 DAYS    ZOLMitriptan (ZOMIG) 5 mg, Oral,  Once as needed   No Known Allergies   Current Outpatient Medications on File Prior to Visit   Medication Sig Dispense Refill    acetaminophen (TYLENOL) 500 mg tablet Take 2 tablets (1,000 mg total) by mouth every 6 (six) hours as needed for mild pain 60 tablet 0    allopurinol (ZYLOPRIM) 100 mg tablet TK 2 TS PO QAM AND 2 TS HS      Cholecalciferol (Vitamin D3) 1.25 MG (59027 UT) CAPS TK 1 C PO 1 TIME Q WK TAT      Fasenra subcutaneous injection       fexofenadine (ALLEGRA) 180 MG tablet Take 180 mg by mouth daily      fluticasone (FLONASE) 50 mcg/act nasal spray SHAKE LQ AND U 2 SPRAYS IEN D      indomethacin (INDOCIN) 50 mg capsule TAKE 1 CAPSULE BY MOUTH EVERY 8 HOURS IF NEEDED DURING GOUT FLARE WITH FOOD      loratadine (CLARITIN) 10 mg tablet Take 1 tablet by mouth every morning Oral      Mounjaro 15 MG/0.5ML SOAJ ADMINISTER 15 MG UNDER THE SKIN 1 TIME A WEEK      omeprazole (PriLOSEC) 20 mg delayed release capsule Take 1 capsule (20 mg total) by mouth daily 30 capsule 3    tadalafil (CIALIS) 5 MG tablet TAKE 1 TABLET BY MOUTH 1 TIME 30 MINUTES BEFORE ACTIVITY      valACYclovir (VALTREX) 1,000 mg tablet TAKE 1 TABLET 3 TIMES A DAY FOR 10 DAYS      ZOLMitriptan (ZOMIG) 5 MG tablet Take 5 mg by mouth once as needed for migraine      [DISCONTINUED] Advair Diskus 500-50 MCG/DOSE inhaler INL 1 PUFF PO BID (Patient not taking: Reported on 4/15/2025)      [DISCONTINUED] albuterol (2.5 mg/3 mL) 0.083 % nebulizer solution USE 1 VIAL IN NEBULIZER QID PRN (Patient not taking: Reported on 4/15/2025)      [DISCONTINUED] amitriptyline (ELAVIL) 10 mg tablet Take 1 tablet (10 mg total) by mouth daily at bedtime (Patient not taking: Reported on 4/15/2025) 30 tablet 2    [DISCONTINUED] budesonide (PULMICORT) 0.5 mg/2 mL nebulizer solution VVN BID PRN (Patient not taking: Reported on 4/15/2025)      [DISCONTINUED] celecoxib (CeleBREX) 200 mg capsule Take 1 capsule by mouth once a day as needed for pain Oral (Patient not taking:  "Reported on 4/15/2025)      [DISCONTINUED] Colchicine 0.6 MG CAPS  (Patient not taking: Reported on 4/15/2025)      [DISCONTINUED] lisinopril (ZESTRIL) 5 mg tablet TK 1 T PO D (Patient not taking: Reported on 4/15/2025)      [DISCONTINUED] metFORMIN (GLUCOPHAGE) 500 mg tablet TK 2 TS PO BID (Patient not taking: Reported on 4/15/2025)      [DISCONTINUED] montelukast (SINGULAIR) 10 mg tablet TK 1 T PO QD (Patient not taking: Reported on 4/10/2025)      [DISCONTINUED] predniSONE 1 mg tablet TK 3 TS PO Q 8 H PRN (Patient not taking: Reported on 4/10/2025)      [DISCONTINUED] predniSONE 10 mg tablet  (Patient not taking: Reported on 4/15/2025)      [DISCONTINUED] Spiriva Respimat 2.5 MCG/ACT AERS inhaler INL 2 PFS PO D (Patient not taking: Reported on 4/15/2025)       No current facility-administered medications on file prior to visit.      Social History     Tobacco Use    Smoking status: Former     Types: Cigarettes    Smokeless tobacco: Never    Tobacco comments:     only smoked for two years   Vaping Use    Vaping status: Never Used   Substance and Sexual Activity    Alcohol use: Never    Drug use: Not Currently    Sexual activity: Not on file        Objective   /70 (BP Location: Left arm, Patient Position: Sitting, Cuff Size: Standard)   Pulse 68   Temp 97.5 °F (36.4 °C)   Resp 18   Ht 5' 11\" (1.803 m)   Wt 100 kg (220 lb 9.6 oz)   SpO2 96%   BMI 30.77 kg/m²      Physical Exam  Constitutional:       Appearance: Normal appearance.   HENT:      Head: Normocephalic and atraumatic.      Nose: Nose normal.   Eyes:      General: No scleral icterus.     Conjunctiva/sclera: Conjunctivae normal.   Cardiovascular:      Rate and Rhythm: Normal rate and regular rhythm.      Heart sounds: Normal heart sounds.   Pulmonary:      Effort: Pulmonary effort is normal.      Breath sounds: Normal breath sounds.   Abdominal:      General: There is no distension.      Palpations: Abdomen is soft.      Tenderness: There is no " abdominal tenderness.      Comments: Reducible right inguinal hernia, no obvious inguinal hernia on the left   Musculoskeletal:         General: No signs of injury.   Skin:     General: Skin is warm.      Coloration: Skin is not jaundiced.   Neurological:      General: No focal deficit present.      Mental Status: He is alert and oriented to person, place, and time.   Psychiatric:         Mood and Affect: Mood normal.         Behavior: Behavior normal.

## 2025-04-24 ENCOUNTER — TELEPHONE (OUTPATIENT)
Dept: SURGERY | Facility: CLINIC | Age: 55
End: 2025-04-24

## 2025-04-24 ENCOUNTER — ANESTHESIA EVENT (OUTPATIENT)
Dept: PERIOP | Facility: HOSPITAL | Age: 55
End: 2025-04-24
Payer: COMMERCIAL

## 2025-04-24 NOTE — PRE-PROCEDURE INSTRUCTIONS
Pre-Surgery Instructions:   Medication Instructions    acetaminophen (TYLENOL) 500 mg tablet PRN - take as directed    allopurinol (ZYLOPRIM) 100 mg tablet Take as directed    Fasenra subcutaneous injection Bi-monthey injection for headaches    fluticasone (FLONASE) 50 mcg/act nasal spray Uses PRN- OK to take day of surgery    loratadine (CLARITIN) 10 mg tablet Take as directed    Mounjaro 15 MG/0.5ML SOAJ Stop taking 7 days prior to surgery. LD 4/15/25    Multiple Vitamin (multivitamin) capsule Stop taking 7 days prior to surgery.    omeprazole (PriLOSEC) 20 mg delayed release capsule Take as directed    tadalafil (CIALIS) 5 MG tablet Uses PRN- DO NOT take day of surgery    ZOLMitriptan (ZOMIG) 5 MG tablet Take as directed        Medication instructions for day of surgery reviewed. Please take all instructed medications with only a sip of water.       You will receive a call one business day prior to surgery with an arrival time and hospital directions. If your surgery is scheduled on a Monday, the hospital will be calling you on the Friday prior to your surgery. If you have not heard from anyone by 8pm, please call the hospital supervisor through the hospital  at [phone]. ([place] [phone] or [place] [phone]).    Do not eat or drink anything after midnight the night before your surgery, including candy, mints, lifesavers, or chewing gum. Do not drink alcohol 24hrs before your surgery. Try not to smoke at least 24hrs before your surgery.       Follow the pre surgery showering instructions as listed in the “My Surgical Experience Booklet” or otherwise provided by your surgeon's office. Do not use a blade to shave the surgical area 1 week before surgery. It is okay to use a clean electric clippers up to 24 hours before surgery. Do not apply any lotions, creams, including makeup, cologne, deodorant, or perfumes after showering on the day of your surgery. Do not use dry shampoo, hair spray, hair  gel, or any type of hair products.     No contact lenses, eye make-up, or artificial eyelashes. Remove nail polish, including gel polish, and any artificial, gel, or acrylic nails if possible. Remove all jewelry including rings and body piercing jewelry.     Wear causal clothing that is easy to take on and off. Consider your type of surgery.    Keep any valuables, jewelry, piercings at home. Please bring any specially ordered equipment (sling, braces) if indicated.    Arrange for a responsible person to drive you to and from the hospital on the day of your surgery. Please confirm the visitor policy for the day of your procedure when you receive your phone call with an arrival time.     Call the surgeon's office with any new illnesses, exposures, or additional questions prior to surgery.    Please reference your “My Surgical Experience Booklet” for additional information to prepare for your upcoming surgery.

## 2025-04-24 NOTE — TELEPHONE ENCOUNTER
Called and LMOM for him to call preadmissions nurse back. Number was provided for him to call. If he has any questions he can call me directly.

## 2025-04-30 ENCOUNTER — HOSPITAL ENCOUNTER (OUTPATIENT)
Facility: HOSPITAL | Age: 55
Setting detail: OUTPATIENT SURGERY
Discharge: HOME | End: 2025-04-30
Attending: STUDENT IN AN ORGANIZED HEALTH CARE EDUCATION/TRAINING PROGRAM | Admitting: STUDENT IN AN ORGANIZED HEALTH CARE EDUCATION/TRAINING PROGRAM
Payer: COMMERCIAL

## 2025-04-30 ENCOUNTER — ANESTHESIA (OUTPATIENT)
Dept: PERIOP | Facility: HOSPITAL | Age: 55
End: 2025-04-30
Payer: COMMERCIAL

## 2025-04-30 VITALS
TEMPERATURE: 97 F | DIASTOLIC BLOOD PRESSURE: 84 MMHG | WEIGHT: 220.68 LBS | HEART RATE: 62 BPM | BODY MASS INDEX: 30.9 KG/M2 | SYSTOLIC BLOOD PRESSURE: 131 MMHG | OXYGEN SATURATION: 96 % | RESPIRATION RATE: 17 BRPM | HEIGHT: 71 IN

## 2025-04-30 DIAGNOSIS — K80.20 CALCULUS OF GALLBLADDER WITHOUT CHOLECYSTITIS WITHOUT OBSTRUCTION: ICD-10-CM

## 2025-04-30 DIAGNOSIS — K40.90 RIGHT INGUINAL HERNIA: ICD-10-CM

## 2025-04-30 DIAGNOSIS — K80.20 CALCULUS OF GALLBLADDER WITHOUT CHOLECYSTITIS WITHOUT OBSTRUCTION: Primary | ICD-10-CM

## 2025-04-30 PROCEDURE — 49650 LAP ING HERNIA REPAIR INIT: CPT | Performed by: STUDENT IN AN ORGANIZED HEALTH CARE EDUCATION/TRAINING PROGRAM

## 2025-04-30 PROCEDURE — C1781 MESH (IMPLANTABLE): HCPCS | Performed by: STUDENT IN AN ORGANIZED HEALTH CARE EDUCATION/TRAINING PROGRAM

## 2025-04-30 PROCEDURE — 88304 TISSUE EXAM BY PATHOLOGIST: CPT | Performed by: PATHOLOGY

## 2025-04-30 PROCEDURE — 47562 LAPAROSCOPIC CHOLECYSTECTOMY: CPT | Performed by: CLINICAL NURSE SPECIALIST

## 2025-04-30 PROCEDURE — 47562 LAPAROSCOPIC CHOLECYSTECTOMY: CPT | Performed by: STUDENT IN AN ORGANIZED HEALTH CARE EDUCATION/TRAINING PROGRAM

## 2025-04-30 PROCEDURE — 49650 LAP ING HERNIA REPAIR INIT: CPT | Performed by: CLINICAL NURSE SPECIALIST

## 2025-04-30 DEVICE — LAPAROSCOPIC SELF-FIXATING MESH POLYESTER WITH POLYLACTIC ACID GRIPS AND COLLAGEN FILM
Type: IMPLANTABLE DEVICE | Site: INGUINAL | Status: FUNCTIONAL
Brand: PROGRIP

## 2025-04-30 RX ORDER — CEFAZOLIN SODIUM 2 G/50ML
2000 SOLUTION INTRAVENOUS ONCE
Status: COMPLETED | OUTPATIENT
Start: 2025-04-30 | End: 2025-04-30

## 2025-04-30 RX ORDER — PROMETHAZINE HYDROCHLORIDE 25 MG/ML
12.5 INJECTION, SOLUTION INTRAMUSCULAR; INTRAVENOUS ONCE AS NEEDED
Status: DISCONTINUED | OUTPATIENT
Start: 2025-04-30 | End: 2025-04-30 | Stop reason: HOSPADM

## 2025-04-30 RX ORDER — MAGNESIUM HYDROXIDE 1200 MG/15ML
LIQUID ORAL AS NEEDED
Status: DISCONTINUED | OUTPATIENT
Start: 2025-04-30 | End: 2025-04-30 | Stop reason: HOSPADM

## 2025-04-30 RX ORDER — CHLORHEXIDINE GLUCONATE 40 MG/ML
SOLUTION TOPICAL DAILY PRN
Status: DISCONTINUED | OUTPATIENT
Start: 2025-04-30 | End: 2025-04-30 | Stop reason: HOSPADM

## 2025-04-30 RX ORDER — HEPARIN SODIUM 5000 [USP'U]/ML
5000 INJECTION, SOLUTION INTRAVENOUS; SUBCUTANEOUS ONCE
Status: COMPLETED | OUTPATIENT
Start: 2025-04-30 | End: 2025-04-30

## 2025-04-30 RX ORDER — DEXAMETHASONE SODIUM PHOSPHATE 10 MG/ML
INJECTION, SOLUTION INTRAMUSCULAR; INTRAVENOUS AS NEEDED
Status: DISCONTINUED | OUTPATIENT
Start: 2025-04-30 | End: 2025-04-30

## 2025-04-30 RX ORDER — ONDANSETRON 2 MG/ML
INJECTION INTRAMUSCULAR; INTRAVENOUS AS NEEDED
Status: DISCONTINUED | OUTPATIENT
Start: 2025-04-30 | End: 2025-04-30

## 2025-04-30 RX ORDER — LIDOCAINE HYDROCHLORIDE 20 MG/ML
INJECTION, SOLUTION EPIDURAL; INFILTRATION; INTRACAUDAL; PERINEURAL AS NEEDED
Status: DISCONTINUED | OUTPATIENT
Start: 2025-04-30 | End: 2025-04-30

## 2025-04-30 RX ORDER — TRAMADOL HYDROCHLORIDE 50 MG/1
50 TABLET ORAL EVERY 6 HOURS PRN
Status: DISCONTINUED | OUTPATIENT
Start: 2025-04-30 | End: 2025-04-30 | Stop reason: HOSPADM

## 2025-04-30 RX ORDER — ACETAMINOPHEN 325 MG/1
650 TABLET ORAL EVERY 6 HOURS PRN
Status: DISCONTINUED | OUTPATIENT
Start: 2025-04-30 | End: 2025-04-30 | Stop reason: HOSPADM

## 2025-04-30 RX ORDER — TRAMADOL HYDROCHLORIDE 50 MG/1
50 TABLET ORAL EVERY 6 HOURS PRN
Qty: 16 TABLET | Refills: 0 | Status: SHIPPED | OUTPATIENT
Start: 2025-04-30 | End: 2025-04-30 | Stop reason: SDUPTHER

## 2025-04-30 RX ORDER — FENTANYL CITRATE 50 UG/ML
INJECTION, SOLUTION INTRAMUSCULAR; INTRAVENOUS AS NEEDED
Status: DISCONTINUED | OUTPATIENT
Start: 2025-04-30 | End: 2025-04-30

## 2025-04-30 RX ORDER — ROCURONIUM BROMIDE 10 MG/ML
INJECTION, SOLUTION INTRAVENOUS AS NEEDED
Status: DISCONTINUED | OUTPATIENT
Start: 2025-04-30 | End: 2025-04-30

## 2025-04-30 RX ORDER — DIPHENHYDRAMINE HYDROCHLORIDE 50 MG/ML
12.5 INJECTION, SOLUTION INTRAMUSCULAR; INTRAVENOUS ONCE AS NEEDED
Status: COMPLETED | OUTPATIENT
Start: 2025-04-30 | End: 2025-04-30

## 2025-04-30 RX ORDER — PROPOFOL 10 MG/ML
INJECTION, EMULSION INTRAVENOUS AS NEEDED
Status: DISCONTINUED | OUTPATIENT
Start: 2025-04-30 | End: 2025-04-30

## 2025-04-30 RX ORDER — DOCUSATE SODIUM 100 MG/1
100 CAPSULE, LIQUID FILLED ORAL 3 TIMES DAILY PRN
Qty: 30 CAPSULE | Refills: 0 | Status: SHIPPED | OUTPATIENT
Start: 2025-04-30

## 2025-04-30 RX ORDER — HYDROMORPHONE HCL/PF 1 MG/ML
0.5 SYRINGE (ML) INJECTION
Status: DISCONTINUED | OUTPATIENT
Start: 2025-04-30 | End: 2025-04-30 | Stop reason: HOSPADM

## 2025-04-30 RX ORDER — ONDANSETRON 2 MG/ML
4 INJECTION INTRAMUSCULAR; INTRAVENOUS EVERY 6 HOURS PRN
Status: DISCONTINUED | OUTPATIENT
Start: 2025-04-30 | End: 2025-04-30 | Stop reason: HOSPADM

## 2025-04-30 RX ORDER — TRAMADOL HYDROCHLORIDE 50 MG/1
50 TABLET ORAL EVERY 6 HOURS PRN
Qty: 16 TABLET | Refills: 0 | Status: SHIPPED | OUTPATIENT
Start: 2025-04-30 | End: 2025-05-10

## 2025-04-30 RX ORDER — BUPIVACAINE HYDROCHLORIDE 2.5 MG/ML
INJECTION, SOLUTION EPIDURAL; INFILTRATION; INTRACAUDAL; PERINEURAL AS NEEDED
Status: DISCONTINUED | OUTPATIENT
Start: 2025-04-30 | End: 2025-04-30 | Stop reason: HOSPADM

## 2025-04-30 RX ORDER — MIDAZOLAM HYDROCHLORIDE 2 MG/2ML
INJECTION, SOLUTION INTRAMUSCULAR; INTRAVENOUS AS NEEDED
Status: DISCONTINUED | OUTPATIENT
Start: 2025-04-30 | End: 2025-04-30

## 2025-04-30 RX ORDER — FENTANYL CITRATE/PF 50 MCG/ML
25 SYRINGE (ML) INJECTION
Status: DISCONTINUED | OUTPATIENT
Start: 2025-04-30 | End: 2025-04-30 | Stop reason: HOSPADM

## 2025-04-30 RX ORDER — KETOROLAC TROMETHAMINE 30 MG/ML
INJECTION, SOLUTION INTRAMUSCULAR; INTRAVENOUS AS NEEDED
Status: DISCONTINUED | OUTPATIENT
Start: 2025-04-30 | End: 2025-04-30

## 2025-04-30 RX ORDER — HYDROMORPHONE HCL/PF 1 MG/ML
SYRINGE (ML) INJECTION AS NEEDED
Status: DISCONTINUED | OUTPATIENT
Start: 2025-04-30 | End: 2025-04-30

## 2025-04-30 RX ORDER — SODIUM CHLORIDE, SODIUM LACTATE, POTASSIUM CHLORIDE, CALCIUM CHLORIDE 600; 310; 30; 20 MG/100ML; MG/100ML; MG/100ML; MG/100ML
INJECTION, SOLUTION INTRAVENOUS CONTINUOUS PRN
Status: DISCONTINUED | OUTPATIENT
Start: 2025-04-30 | End: 2025-04-30

## 2025-04-30 RX ADMIN — FENTANYL CITRATE 50 MCG: 0.05 INJECTION, SOLUTION INTRAMUSCULAR; INTRAVENOUS at 10:35

## 2025-04-30 RX ADMIN — HEPARIN SODIUM 5000 UNITS: 5000 INJECTION INTRAVENOUS; SUBCUTANEOUS at 08:15

## 2025-04-30 RX ADMIN — DIPHENHYDRAMINE HYDROCHLORIDE 12.5 MG: 50 INJECTION, SOLUTION INTRAMUSCULAR; INTRAVENOUS at 13:35

## 2025-04-30 RX ADMIN — DEXMEDETOMIDINE HYDROCHLORIDE 8 MCG: 100 INJECTION, SOLUTION, CONCENTRATE INTRAVENOUS at 10:30

## 2025-04-30 RX ADMIN — ONDANSETRON 4 MG: 2 INJECTION INTRAMUSCULAR; INTRAVENOUS at 12:25

## 2025-04-30 RX ADMIN — HYDROMORPHONE HYDROCHLORIDE 1 MG: 1 INJECTION, SOLUTION INTRAMUSCULAR; INTRAVENOUS; SUBCUTANEOUS at 12:09

## 2025-04-30 RX ADMIN — SODIUM CHLORIDE, SODIUM LACTATE, POTASSIUM CHLORIDE, AND CALCIUM CHLORIDE: .6; .31; .03; .02 INJECTION, SOLUTION INTRAVENOUS at 10:10

## 2025-04-30 RX ADMIN — TRAMADOL HYDROCHLORIDE 50 MG: 50 TABLET, COATED ORAL at 14:04

## 2025-04-30 RX ADMIN — MIDAZOLAM 2 MG: 1 INJECTION INTRAMUSCULAR; INTRAVENOUS at 10:30

## 2025-04-30 RX ADMIN — DEXMEDETOMIDINE HYDROCHLORIDE 6 MCG: 100 INJECTION, SOLUTION, CONCENTRATE INTRAVENOUS at 12:10

## 2025-04-30 RX ADMIN — CEFAZOLIN SODIUM 2000 MG: 2 SOLUTION INTRAVENOUS at 10:32

## 2025-04-30 RX ADMIN — SUGAMMADEX 300 MG: 100 INJECTION, SOLUTION INTRAVENOUS at 12:41

## 2025-04-30 RX ADMIN — ROCURONIUM BROMIDE 50 MG: 10 INJECTION, SOLUTION INTRAVENOUS at 10:37

## 2025-04-30 RX ADMIN — ROCURONIUM BROMIDE 20 MG: 10 INJECTION, SOLUTION INTRAVENOUS at 11:38

## 2025-04-30 RX ADMIN — LIDOCAINE HYDROCHLORIDE 100 MG: 20 INJECTION, SOLUTION EPIDURAL; INFILTRATION; INTRACAUDAL; PERINEURAL at 10:35

## 2025-04-30 RX ADMIN — DEXAMETHASONE SODIUM PHOSPHATE 10 MG: 10 INJECTION, SOLUTION INTRAMUSCULAR; INTRAVENOUS at 10:45

## 2025-04-30 RX ADMIN — FENTANYL CITRATE 50 MCG: 0.05 INJECTION, SOLUTION INTRAMUSCULAR; INTRAVENOUS at 10:59

## 2025-04-30 RX ADMIN — FENTANYL CITRATE 25 MCG: 0.05 INJECTION, SOLUTION INTRAMUSCULAR; INTRAVENOUS at 13:30

## 2025-04-30 RX ADMIN — PROPOFOL 200 MG: 10 INJECTION, EMULSION INTRAVENOUS at 10:36

## 2025-04-30 RX ADMIN — DEXMEDETOMIDINE HYDROCHLORIDE 6 MCG: 100 INJECTION, SOLUTION, CONCENTRATE INTRAVENOUS at 12:38

## 2025-04-30 RX ADMIN — ONDANSETRON 4 MG: 2 INJECTION INTRAMUSCULAR; INTRAVENOUS at 14:52

## 2025-04-30 RX ADMIN — KETOROLAC TROMETHAMINE 30 MG: 30 INJECTION, SOLUTION INTRAMUSCULAR; INTRAVENOUS at 12:37

## 2025-04-30 NOTE — ANESTHESIA PREPROCEDURE EVALUATION
Procedure:  REPAIR HERNIA INGUINAL, LAPAROSCOPIC, Possible Open, Possible Bilateral (Right: Groin)  CHOLECYSTECTOMY LAPAROSCOPIC, Possible Open (Abdomen)    Relevant Problems   ANESTHESIA (within normal limits)      CARDIO   (+) SOB (shortness of breath) on exertion   (-) MI (myocardial infarction) (HCC)      ENDO   (+) Type 2 diabetes mellitus without complication, without long-term current use of insulin (HCC)      GI/HEPATIC (within normal limits)  NPO confirmed  BMI 30.8      /RENAL (within normal limits)      HEMATOLOGY (within normal limits)      NEURO/PSYCH (within normal limits)   (-) CVA (cerebral vascular accident) (HCC)   (-) Seizures (HCC)      PULMONARY   (+) SOB (shortness of breath) on exertion   (+) Severe persistent asthma without complication   (-) Sleep apnea   (-) URI (upper respiratory infection)     No Known Allergies    Social History     Tobacco Use    Smoking status: Former     Types: Cigarettes    Smokeless tobacco: Never    Tobacco comments:     only smoked for two years   Vaping Use    Vaping status: Never Used   Substance Use Topics    Alcohol use: Never    Drug use: Not Currently     Current Outpatient Medications   Medication Instructions    acetaminophen (TYLENOL) 1,000 mg, Oral, Every 6 hours PRN    allopurinol (ZYLOPRIM) 100 mg tablet TK 2 TS PO QAM AND 2 TS HS    Cholecalciferol (Vitamin D3) 1.25 MG (78220 UT) CAPS TK 1 C PO 1 TIME Q WK TAT    Fasenra subcutaneous injection No dose, route, or frequency recorded.    fexofenadine (ALLEGRA) 180 mg, Oral, Daily    fluticasone (FLONASE) 50 mcg/act nasal spray SHAKE LQ AND U 2 SPRAYS IEN D    indomethacin (INDOCIN) 50 mg capsule TAKE 1 CAPSULE BY MOUTH EVERY 8 HOURS IF NEEDED DURING GOUT FLARE WITH FOOD    loratadine (CLARITIN) 10 mg tablet Take 1 tablet by mouth every morning Oral    Mounjaro 15 MG/0.5ML SOAJ ADMINISTER 15 MG UNDER THE SKIN 1 TIME A WEEK    Multiple Vitamin (multivitamin) capsule 1 capsule, Daily    omeprazole  (PRILOSEC) 20 mg, Oral, Daily    tadalafil (CIALIS) 5 MG tablet TAKE 1 TABLET BY MOUTH 1 TIME 30 MINUTES BEFORE ACTIVITY    valACYclovir (VALTREX) 1,000 mg tablet TAKE 1 TABLET 3 TIMES A DAY FOR 10 DAYS    ZOLMitriptan (ZOMIG) 5 mg, Once as needed   -also reports controlled release caffeine supplement, not taken in past 2 weeks    Lab Results   Component Value Date    WBC 12.94 (H) 03/27/2025    HGB 15.3 03/27/2025    HCT 45.8 03/27/2025     03/27/2025    SODIUM 139 03/27/2025    K 3.9 03/27/2025     03/27/2025    CO2 29 03/27/2025    BUN 18 03/27/2025    CREATININE 0.91 03/27/2025    GLUC 72 03/27/2025    HGBA1C 4.6 01/28/2025    AST 14 03/27/2025    ALT 11 03/27/2025    ALKPHOS 92 03/27/2025    TBILI 0.70 03/27/2025    ALB 4.4 03/27/2025     Vitals:    04/30/25 0814   BP: 155/72   Pulse: 65   Resp: 18   Temp: 98.6 °F (37 °C)   SpO2: 97%     Echo 10/15/20  SUMMARY  LEFT VENTRICLE:  Systolic function was normal. Ejection fraction was estimated to be 60 %.  There were no regional wall motion abnormalities.     RIGHT VENTRICLE:  The size was at the upper limits of normal.     TRICUSPID VALVE:  There was trace regurgitation.     Physical Exam    Airway    Mallampati score: III  TM Distance: >3 FB  Neck ROM: full     Dental   Comment: Denies loose/chipped teeth, No notable dental hx     Cardiovascular  Rhythm: regular, Rate: normal, Cardiovascular exam normal    Pulmonary  Pulmonary exam normal Breath sounds clear to auscultation    Other Findings        Anesthesia Plan  ASA Score- 3     Anesthesia Type- general with ASA Monitors.         Additional Monitors:     Airway Plan: ETT.    Comment: Consent obtained for single shot Bilateral TAP block for post-op pain management if hand-assisted/ open. Discussed risks including bleeding, infection, nerve damage, local anesthetic systemic toxicity and failure..       Plan Factors-Exercise tolerance (METS): >4 METS.    Chart reviewed.   Existing labs reviewed.  Patient summary reviewed.    Patient is not a current smoker.              Induction- intravenous.    Postoperative Plan- Plan for postoperative opioid use.     Perioperative Resuscitation Plan - Level 1 - Full Code.       Informed Consent- Anesthetic plan and risks discussed with patient and spouse.  I personally reviewed this patient with the CRNA. Discussed and agreed on the Anesthesia Plan with the CRNA..    NPO Status:  Vitals Value Taken Time   Date of last liquid 04/29/25 04/30/25 0812   Time of last liquid 2300 04/30/25 0812   Date of last solid 04/29/25 04/30/25 0812   Time of last solid 2000 04/30/25 0812

## 2025-04-30 NOTE — ANESTHESIA POSTPROCEDURE EVALUATION
Post-Op Assessment Note    CV Status:  Stable  Pain Score: 2    Pain management: adequate    Multimodal analgesia used between 6 hours prior to anesthesia start to PACU discharge    Mental Status:  Alert and awake   Hydration Status:  Euvolemic   PONV Controlled:  Controlled   Airway Patency:  Patent     Post Op Vitals Reviewed: Yes    No anethesia notable event occurred.    Staff: CRNA, Anesthesiologist           Last Filed PACU Vitals:  Vitals Value Taken Time   Temp     Pulse     BP     Resp     SpO2         Modified Keith:     Vitals Value Taken Time   Activity 2 04/30/25 1345   Respiration 2 04/30/25 1345   Circulation 2 04/30/25 1345   Consciousness 2 04/30/25 1345   Oxygen Saturation 2 04/30/25 1345     Modified Keith Score: 10

## 2025-04-30 NOTE — ANESTHESIA POSTPROCEDURE EVALUATION
Post-Op Assessment Note    CV Status:  Stable  Pain Score: 2    Pain management: adequate    Multimodal analgesia used between 6 hours prior to anesthesia start to PACU discharge    Mental Status:  Alert and awake   Hydration Status:  Euvolemic   PONV Controlled:  Controlled   Airway Patency:  Patent     Post Op Vitals Reviewed: Yes    No anethesia notable event occurred.    Staff: CRNA           Last Filed PACU Vitals:  Vitals Value Taken Time   Temp     Pulse     BP     Resp     SpO2

## 2025-04-30 NOTE — INTERVAL H&P NOTE
H&P reviewed. After examining the patient I find no changes in the patients condition since the H&P had been written.    Vitals:    04/30/25 0814   BP: 155/72   Pulse: 65   Resp: 18   Temp: 98.6 °F (37 °C)   SpO2: 97%

## 2025-04-30 NOTE — TELEPHONE ENCOUNTER
Pharmacy is closed-Pharmacy change    Reason for call:   [x] Refill   [] Prior Auth  [] Other:     Office:   [] PCP/Provider -   [x] Specialty/Provider -     Medication: traMADol (ULTRAM) 50 mg tablet     Dose/Frequency: Take 1 tablet (50 mg total) by mouth every 6 (six) hours as needed for severe pain for up to 10 days     Quantity: 16    Pharmacy: [facility]   Does the patient have enough for 3 days?   [] Yes   [x] No - Send as HP to POD

## 2025-04-30 NOTE — OP NOTE
OPERATIVE REPORT  PATIENT NAME: Deepak Aparicio    :  1970  MRN: [id number]  Pt Location: [facility]OM 02    SURGERY DATE: 2025    Surgeons and Role:     * Elliot Krueger DO - Primary     * Jhon Orozco PA-C - Assisting    Preop Diagnosis:  Right inguinal hernia [K40.90]  Calculus of gallbladder without cholecystitis without obstruction [K80.20]    Post-Op Diagnosis Codes:     * Right inguinal hernia [K40.90]     * Calculus of gallbladder without cholecystitis without obstruction [K80.20]    Procedure(s):  Bilateral - REPAIR HERNIA INGUINAL. LAPAROSCOPIC  CHOLECYSTECTOMY LAPAROSCOPIC    Specimen(s):  ID Type Source Tests Collected by Time Destination   1 : GALLBLADDER AND CONTENTS Tissue Gallbladder TISSUE EXAM Elliot Krueger DO 2025 1112        Estimated Blood Loss:   Minimal    Drains:  * No LDAs found *    Anesthesia Type:   General    Operative Indications:  Right inguinal hernia [K40.90]  Calculus of gallbladder without cholecystitis without obstruction [K80.20]    Operative Findings:  Moderate-sized right indirect inguinal hernia  Small left indirect inguinal hernia  Gallbladder with large gallstone    Complications:   None    Procedure and Technique:  The patient was seen again in Preoperative Holding.  All the risks, benefits, complications, treatment options, and expected outcomes were discussed with the patient and family at length. All questions were answered to satisfaction. There was concurrence with the proposed plan and informed consent was obtained. The site of surgery was properly noted/marked. The patient was taken to Operating Room, identified, and the procedure verified as Right laparoscopic inguinal hernia repair with mesh, possible open, possible bilateral; laparoscopic cholecystectomy, possible open.    The patient was placed in the supine position on the operating room table.  The patient had received preoperative antibiotics and SCDs placed on the bilateral  lower extremities.  Anesthesia was then induced and the patient was intubated.  The patient's bilateral arms were tucked.    The abdomen was then prepped and draped in the usual sterile fashion using ChloraPrep.  A Time-Out was then performed with all involved present confirming the correct patient, procedure, antibiotics, and any additional concerns. A 1.5 centimeter supraumbilical incision was made in a transverse fashion using a #15 blade and the umbilical stalk was grasped and elevated.  The patient was noted to have a small umbilical hernia and the umbilicus was elevated off the fascia.  The fascial edges were grasped with Kocher clamps. Using a large blunt Shamika clamp the peritoneum was entered under direct visualization.  A 11 mm port was then placed in the fascial opening and pneumoperitoneum was established.  Initial pressure upon establishing pneumoperitoneum was noted to be low.  Two additional 5 mm ports were placed under direct visualization; one on the right side of the abdomen just above the umbilicus in the midclavicular line and one on the left side of the abdomen just above the umbilicus in the midclavicular line.  There was a moderate-sized right indirect inguinal hernia and a small left indirect inguinal hernia. The peritoneum overlying the Right groin was incised and reflected. The hernia sac was identified and carefully dissected from the cord structures without injuring them. The preperitoneal space was carefully expanded using blunt dissection in preparation for mesh placement. A 15 cm by 10 cm ProGrip mesh was marked for orientation and introduced into the abdomen via the 11 mm port. The mesh was introduced into the preperitoneal space and and spread evenly making sure to cover the hernia defect. The peritoneum was closed with running suture of 0 absorbable V-Loc with the Endo Stitch device. Hemostasis was noted. The peritoneum overlying the Left groin was incised and reflected. The hernia  sac was identified and carefully dissected from the cord structures without injuring them. The preperitoneal space was carefully expanded using blunt dissection in preparation for mesh placement. A 15 cm by 10 cm ProGrip mesh was marked for orientation and introduced into the abdomen via the 11 mm port. The mesh was introduced into the preperitoneal space and and spread evenly making sure to cover the hernia defect. The peritoneum was closed with running suture of 0 absorbable V-Loc with the Endo Stitch device. Hemostasis was noted.    Additional 5 mm ports were placed under direct visualization in the following locations:  Subxiphoid, Right subcostal in the midclavicular line.  The patient was then placed in reverse Trendelenburg and left lateral decubitus position.  The gallbladder was exposed and was without signs of acute inflammation.  There was a large stone in the gallbladder.  The gallbladder fundus was then grasped and retracted cephalad.  The gallbladder infundibulum was grasped and retracted cephalad and lateral.  Using a combination of blunt dissection using a [place] and the suction  both the cystic duct and cystic artery were exposed and skeletonized.  Critical view was then obtained.  The cystic duct was clipped using 5 mm clips, 2 clips distally and 1 clip proximally.  The cystic duct was then transected using laparoscopic scissors.  The cystic artery was clipped using 5 mm clips, 1 clip distally and 2 clips proximally.  The cystic artery was then transected using laparoscopic scissors.  The gallbladder was then dissected off the liver bed using hook electrocautery.  Hemostasis was noted.  Both the cystic artery and cystic duct stumps were evaluated and no leakage of bile or blood was noted.  The gallbladder was placed in the Endo-Catch bag and removed via the umbilical port.  The abdomen was desufflated and the umbilical fascial defect was closed with 0 Vicryl in an interrupted fashion  using 5 stitches.  The abdomen was then reinsufflated and the fascial incision was inspected and noted to be hemostatic without any insufflation leakage.  The umbilicus was reattached to the fascia using interrupted 0 Vicryl.  Irrigation was instilled above the liver and in the infra hepatic area and was suctioned until clear. The remaining 5 mm ports were removed and the abdomen was desufflated.  Local anesthesia infiltrated in the port sites using 0.25% Marcaine. The port sites were then closed using 4-0 Monocryl.  The abdomen was then cleansed and dried and Steri-Strips, 2 x 2, Tegaderm were used to cover the sites.    All instrument, sponge, and needle counts were noted to be correct at the conclusion of the case.     I was present for the entire procedure., A qualified resident physician was not available., and A physician assistant was required during the procedure for retraction, tissue handling, dissection and suturing.    Patient Disposition:  PACU  and extubated and stable    SIGNATURE: Elliot Krueger DO  DATE: April 30, 2025  TIME: 12:45 PM

## 2025-05-05 ENCOUNTER — TELEPHONE (OUTPATIENT)
Dept: SURGERY | Facility: CLINIC | Age: 55
End: 2025-05-05

## 2025-05-05 NOTE — TELEPHONE ENCOUNTER
Informed pt that the letter to return to work is in the chart. Be mindful not  anything heavier than 15lbs

## 2025-05-05 NOTE — TELEPHONE ENCOUNTER
Returned pt call. Pt wants to know if he can go back to work. He is a . States he is feeling joshua and if he can have a note to go back. Surgery was on 4/30/25 for gallbladder removal and inguina hernia repair.    ;please advise, thank you

## 2025-05-05 NOTE — LETTER
May 5, 2025     Patient: Depeak Aparicio  YOB: 1970  Date of Visit:       To Whom it May Concern:    Deepak Aparicio is under my medical  care. Deepak had surgery on 4/30/2025. He may return to work with no restrictions. Reminder to be mindful and not lift anything heavier than 15 lbs. He has a follow up appointment with us on 05/15/2025 .    If you have any questions or concerns, please don't hesitate to call.         Sincerely,          Owen Brra MD, FACS        CC: No Recipients

## 2025-05-15 ENCOUNTER — OFFICE VISIT (OUTPATIENT)
Dept: SURGERY | Facility: CLINIC | Age: 55
End: 2025-05-15

## 2025-05-15 VITALS
SYSTOLIC BLOOD PRESSURE: 138 MMHG | TEMPERATURE: 97.5 F | DIASTOLIC BLOOD PRESSURE: 72 MMHG | RESPIRATION RATE: 18 BRPM | HEIGHT: 71 IN | OXYGEN SATURATION: 98 % | BODY MASS INDEX: 31.02 KG/M2 | WEIGHT: 221.6 LBS | HEART RATE: 67 BPM

## 2025-05-15 DIAGNOSIS — K80.20 CALCULUS OF GALLBLADDER WITHOUT CHOLECYSTITIS WITHOUT OBSTRUCTION: Primary | ICD-10-CM

## 2025-05-15 DIAGNOSIS — S30.1XXA ABDOMINAL WALL SEROMA, INITIAL ENCOUNTER: ICD-10-CM

## 2025-05-15 DIAGNOSIS — K40.90 RIGHT INGUINAL HERNIA: ICD-10-CM

## 2025-05-15 PROCEDURE — 99024 POSTOP FOLLOW-UP VISIT: CPT | Performed by: STUDENT IN AN ORGANIZED HEALTH CARE EDUCATION/TRAINING PROGRAM

## 2025-05-15 NOTE — PROGRESS NOTES
Name: Deepak Aparicio      : 1970      MRN: [id number]  Encounter Provider: Elliot Krueger DO  Encounter Date: 5/15/2025   Encounter department: [facility]  :  Assessment & Plan  Calculus of gallbladder without cholecystitis without obstruction  55-year-old male status post laparoscopic cholecystectomy on 2025  -Patient is tolerating a diet and having bowel function  - He denies any abdominal pain  -Laparoscopic incisions healing well without erythema induration or drainage, no signs of hernia recurrence in the bilateral groin, right inguinal seroma without signs of infection  - Pathology report reviewed  -Patient has been back to work without issue  -Recommend no heavy lifting greater than 15 to 20 pounds for total of 4 weeks after surgery    Final Diagnosis   A. Gallbladder, cholecystectomy:  - Mild chronic cholecystitis with cholelithiasis      I reviewed the pathology report and discussed the findings with the patient and their accompanying family or caregiver, if present. All questions were answered to satisfaction and the patient along with family or caregiver, if present, voiced understanding.         Right inguinal hernia  55-year-old male status post laparoscopic bilateral inguinal hernia repair with mesh on 2025  -Laparoscopic incisions healing well without erythema induration or drainage, no signs of hernia recurrence in the bilateral groin, right inguinal seroma without signs of infection  -Patient has been back to work without issue  -Recommend no heavy lifting greater than 15 to 20 pounds for total of 4 weeks after surgery  - See plan for seroma below         Abdominal wall seroma, initial encounter  55-year-old male with right inguinal seroma  -Laparoscopic incisions healing well without erythema induration or drainage, no signs of hernia recurrence in the bilateral groin, right inguinal seroma without signs of infection  -Discussed that seromas can  occur after this procedure and normally resolve on their own  -Patient states it has gotten slightly softer  - Recommend warm compresses to the area  -Discussed if it does not go away or become symptomatic can consider needle aspiration  - Follow-up in 4 to 6 weeks for evaluation             History of Present Illness   HPI  Deepak Aparicio is a 55 y.o. male who presents for evaluation status post laparoscopic cholecystectomy and laparoscopic bilateral inguinal hernia repair with mesh.  He is tolerating a diet and having bowel function.  He denies any abdominal pain.  He notes a swelling in the right groin that causes some discomfort.  He states the discomfort is improving in the area has been getting softer.  History obtained from: patient    Review of Systems   Constitutional:  Negative for chills, fatigue and fever.   HENT:  Negative for congestion, hearing loss, rhinorrhea and sore throat.    Eyes:  Negative for pain and discharge.   Respiratory:  Negative for cough, chest tightness and shortness of breath.    Cardiovascular:  Negative for chest pain and palpitations.   Gastrointestinal:  Negative for abdominal pain, constipation, diarrhea, nausea and vomiting.   Endocrine: Negative for cold intolerance and heat intolerance.   Genitourinary:  Negative for difficulty urinating and dysuria.   Musculoskeletal:  Negative for back pain and neck pain.   Skin:  Negative for color change and rash.   Allergic/Immunologic: Negative for environmental allergies and food allergies.   Neurological:  Negative for seizures and headaches.   Hematological:  Does not bruise/bleed easily.   Psychiatric/Behavioral:  Negative for confusion and hallucinations.      Medical History Reviewed by provider this encounter:  Tobacco  Allergies  Meds  Problems  Med Hx  Surg Hx  Fam Hx     .  Past Medical History   Past Medical History:   Diagnosis Date    Asthma     Gout     Kidney stones      Past Surgical History:   Procedure  Laterality Date    APPENDECTOMY      BACK SURGERY      CYST REMOVAL      mouth    PA LAPAROSCOPY SURG CHOLECYSTECTOMY N/A 4/30/2025    Procedure: CHOLECYSTECTOMY LAPAROSCOPIC;  Surgeon: Elliot Krueger DO;  Location: [facility];  Service: General    PA LAPAROSCOPY SURG RPR INITIAL INGUINAL HERNIA Bilateral 4/30/2025    Procedure: REPAIR HERNIA INGUINAL, LAPAROSCOPIC;  Surgeon: Elliot Krueger DO;  Location: [facility];  Service: General    WISDOM TOOTH EXTRACTION       Family History   Problem Relation Age of Onset    COPD Mother     Cancer Mother     Asthma Mother     Cancer Father     Diabetes type II Father     No Known Problems Brother     No Known Problems Brother     No Known Problems Brother     No Known Problems Son     No Known Problems Son       reports that he has quit smoking. His smoking use included cigarettes. He has never used smokeless tobacco. He reports that he does not currently use drugs. He reports that he does not drink alcohol.  Current Outpatient Medications   Medication Instructions    acetaminophen (TYLENOL) 1,000 mg, Oral, Every 6 hours PRN    allopurinol (ZYLOPRIM) 100 mg tablet     Cholecalciferol (Vitamin D3) 1.25 MG (93252 UT) CAPS     Fasenra subcutaneous injection No dose, route, or frequency recorded.    fexofenadine (ALLEGRA) 180 mg, Daily    fluticasone (FLONASE) 50 mcg/act nasal spray     indomethacin (INDOCIN) 50 mg capsule TAKE 1 CAPSULE BY MOUTH EVERY 8 HOURS IF NEEDED DURING GOUT FLARE WITH FOOD    loratadine (CLARITIN) 10 mg tablet     Mounjaro 15 MG/0.5ML SOAJ     Multiple Vitamin (multivitamin) capsule 1 capsule, Daily    omeprazole (PRILOSEC) 20 mg, Oral, Daily    tadalafil (CIALIS) 5 MG tablet     ZOLMitriptan (ZOMIG) 5 mg, Once as needed   Allergies[1]   Medications Ordered Prior to Encounter[2]   Social History     Tobacco Use    Smoking status: Former     Types: Cigarettes    Smokeless tobacco: Never    Tobacco comments:     only smoked for two years  "  Vaping Use    Vaping status: Never Used   Substance and Sexual Activity    Alcohol use: Never    Drug use: Not Currently    Sexual activity: Not on file        Objective   /72 (BP Location: Left arm, Patient Position: Sitting, Cuff Size: Standard)   Pulse 67   Temp 97.5 °F (36.4 °C)   Resp 18   Ht 5' 11\" (1.803 m)   Wt 101 kg (221 lb 9.6 oz)   SpO2 98%   BMI 30.91 kg/m²      Physical Exam  Constitutional:       Appearance: Normal appearance.   HENT:      Head: Normocephalic and atraumatic.      Nose: Nose normal.     Eyes:      General: No scleral icterus.     Conjunctiva/sclera: Conjunctivae normal.       Cardiovascular:      Rate and Rhythm: Normal rate.   Pulmonary:      Effort: Pulmonary effort is normal.   Abdominal:      General: There is no distension.      Palpations: Abdomen is soft.      Tenderness: There is no abdominal tenderness.      Comments: Laparoscopic incisions healing well without erythema induration or drainage, no signs of hernia recurrence in the bilateral groin, right inguinal seroma without signs of infection     Musculoskeletal:         General: No signs of injury.     Skin:     General: Skin is warm.      Coloration: Skin is not jaundiced.     Neurological:      General: No focal deficit present.      Mental Status: He is alert and oriented to person, place, and time.     Psychiatric:         Mood and Affect: Mood normal.         Behavior: Behavior normal.                  [1] No Known Allergies  [2]   Current Outpatient Medications on File Prior to Visit   Medication Sig Dispense Refill    acetaminophen (TYLENOL) 500 mg tablet Take 2 tablets (1,000 mg total) by mouth every 6 (six) hours as needed for mild pain 60 tablet 0    allopurinol (ZYLOPRIM) 100 mg tablet       Cholecalciferol (Vitamin D3) 1.25 MG (71071 UT) CAPS       Fasenra subcutaneous injection       fluticasone (FLONASE) 50 mcg/act nasal spray       loratadine (CLARITIN) 10 mg tablet       Mounjaro 15 MG/0.5ML " SOAJ       Multiple Vitamin (multivitamin) capsule Take 1 capsule by mouth in the morning.      omeprazole (PriLOSEC) 20 mg delayed release capsule Take 1 capsule (20 mg total) by mouth daily 30 capsule 3    tadalafil (CIALIS) 5 MG tablet       ZOLMitriptan (ZOMIG) 5 MG tablet Take 5 mg by mouth once as needed for migraine      fexofenadine (ALLEGRA) 180 MG tablet Take 180 mg by mouth daily (Patient not taking: Reported on 5/15/2025)      indomethacin (INDOCIN) 50 mg capsule TAKE 1 CAPSULE BY MOUTH EVERY 8 HOURS IF NEEDED DURING GOUT FLARE WITH FOOD (Patient not taking: Reported on 5/15/2025)      [DISCONTINUED] docusate sodium (COLACE) 100 mg capsule Take 1 capsule (100 mg total) by mouth 3 (three) times a day as needed for constipation (while taking narcotic pain medication) (Patient not taking: Reported on 5/15/2025) 30 capsule 0    [DISCONTINUED] valACYclovir (VALTREX) 1,000 mg tablet TAKE 1 TABLET 3 TIMES A DAY FOR 10 DAYS       No current facility-administered medications on file prior to visit.

## 2025-05-15 NOTE — ASSESSMENT & PLAN NOTE
55-year-old male status post laparoscopic bilateral inguinal hernia repair with mesh on 4/30/2025  -Laparoscopic incisions healing well without erythema induration or drainage, no signs of hernia recurrence in the bilateral groin, right inguinal seroma without signs of infection  -Patient has been back to work without issue  -Recommend no heavy lifting greater than 15 to 20 pounds for total of 4 weeks after surgery  - See plan for seroma below

## 2025-05-15 NOTE — ASSESSMENT & PLAN NOTE
55-year-old male with right inguinal seroma  -Laparoscopic incisions healing well without erythema induration or drainage, no signs of hernia recurrence in the bilateral groin, right inguinal seroma without signs of infection  -Discussed that seromas can occur after this procedure and normally resolve on their own  -Patient states it has gotten slightly softer  - Recommend warm compresses to the area  -Discussed if it does not go away or become symptomatic can consider needle aspiration  - Follow-up in 4 to 6 weeks for evaluation

## 2025-05-15 NOTE — ASSESSMENT & PLAN NOTE
55-year-old male status post laparoscopic cholecystectomy on 4/30/2025  -Patient is tolerating a diet and having bowel function  - He denies any abdominal pain  -Laparoscopic incisions healing well without erythema induration or drainage, no signs of hernia recurrence in the bilateral groin, right inguinal seroma without signs of infection  - Pathology report reviewed  -Patient has been back to work without issue  -Recommend no heavy lifting greater than 15 to 20 pounds for total of 4 weeks after surgery    Final Diagnosis   A. Gallbladder, cholecystectomy:  - Mild chronic cholecystitis with cholelithiasis      I reviewed the pathology report and discussed the findings with the patient and their accompanying family or caregiver, if present. All questions were answered to satisfaction and the patient along with family or caregiver, if present, voiced understanding.

## 2025-05-29 ENCOUNTER — OFFICE VISIT (OUTPATIENT)
Dept: SURGERY | Facility: CLINIC | Age: 55
End: 2025-05-29

## 2025-05-29 VITALS
WEIGHT: 214.4 LBS | TEMPERATURE: 97.8 F | SYSTOLIC BLOOD PRESSURE: 142 MMHG | HEART RATE: 58 BPM | OXYGEN SATURATION: 93 % | DIASTOLIC BLOOD PRESSURE: 80 MMHG | RESPIRATION RATE: 18 BRPM | BODY MASS INDEX: 30.02 KG/M2 | HEIGHT: 71 IN

## 2025-05-29 DIAGNOSIS — S30.1XXD ABDOMINAL WALL SEROMA, SUBSEQUENT ENCOUNTER: Primary | ICD-10-CM

## 2025-05-29 PROCEDURE — 99024 POSTOP FOLLOW-UP VISIT: CPT | Performed by: STUDENT IN AN ORGANIZED HEALTH CARE EDUCATION/TRAINING PROGRAM

## 2025-05-29 NOTE — ASSESSMENT & PLAN NOTE
55-year-old male with right inguinal seroma  -See HPI  -Right inguinal seroma without signs of infection, unchanged  -Discussed that the seroma is overall about the same and thankfully the patient does not have significant symptoms from it  - Recommend continue warm compresses to the area  -Plan to follow-up in office in roughly 1 month for reevaluation  - Will consider needle aspiration at that time if unchanged  - Follow-up in 4 weeks for evaluation

## 2025-05-29 NOTE — PROGRESS NOTES
Name: Deepak Aparicio      : 1970      MRN: [id number]  Encounter Provider: Elliot Krueger DO  Encounter Date: 2025   Encounter department: [facility]  :  Assessment & Plan  Abdominal wall seroma, subsequent encounter  55-year-old male with right inguinal seroma  -See HPI  -Right inguinal seroma without signs of infection, unchanged  -Discussed that the seroma is overall about the same and thankfully the patient does not have significant symptoms from it  - Recommend continue warm compresses to the area  -Plan to follow-up in office in roughly 1 month for reevaluation  - Will consider needle aspiration at that time if unchanged  - Follow-up in 4 weeks for evaluation               History of Present Illness   HPI  Deepak Aparicio is a 55 y.o. male who presents for evaluation of right groin seroma.  Patient is status post laparoscopic bilateral inguinal hernia repair with mesh.  He states there is a little bit of discomfort in the right groin and testicle, especially with palpation.  He denies any overt pain.  He states the swelling has overall been about the same and is concerned that it is not started to go down yet.  History obtained from: patient    Review of Systems   Constitutional:  Negative for chills, fatigue and fever.   HENT:  Negative for congestion, hearing loss, rhinorrhea and sore throat.    Eyes:  Negative for pain and discharge.   Respiratory:  Negative for cough, chest tightness and shortness of breath.    Cardiovascular:  Negative for chest pain and palpitations.   Gastrointestinal:  Negative for abdominal pain, constipation, diarrhea, nausea and vomiting.        Positive right groin cerumen   Endocrine: Negative for cold intolerance and heat intolerance.   Genitourinary:  Negative for difficulty urinating and dysuria.   Musculoskeletal:  Negative for back pain and neck pain.   Skin:  Negative for color change and rash.   Allergic/Immunologic: Negative for  "environmental allergies and food allergies.   Neurological:  Negative for seizures and headaches.   Hematological:  Does not bruise/bleed easily.   Psychiatric/Behavioral:  Negative for confusion and hallucinations.      Medical History Reviewed by provider this encounter:  Tobacco  Allergies  Meds  Problems  Med Hx  Surg Hx  Fam Hx     .  Past Medical History   Past Medical History[1]  Past Surgical History[2]  Family History[3]   reports that he has quit smoking. His smoking use included cigarettes. He has never used smokeless tobacco. He reports that he does not currently use drugs. He reports that he does not drink alcohol.  Current Outpatient Medications   Medication Instructions    acetaminophen (TYLENOL) 1,000 mg, Oral, Every 6 hours PRN    allopurinol (ZYLOPRIM) 100 mg tablet     Cholecalciferol (Vitamin D3) 1.25 MG (24778 UT) CAPS     Fasenra subcutaneous injection No dose, route, or frequency recorded.    fluticasone (FLONASE) 50 mcg/act nasal spray     loratadine (CLARITIN) 10 mg tablet     Mounjaro 15 MG/0.5ML SOAJ     Multiple Vitamin (multivitamin) capsule 1 capsule, Daily    omeprazole (PRILOSEC) 20 mg, Oral, Daily    tadalafil (CIALIS) 5 MG tablet     ZOLMitriptan (ZOMIG) 5 mg, Once as needed   Allergies[4]   Medications Ordered Prior to Encounter[5]   Social History[6]     Objective   /80 (BP Location: Left arm, Patient Position: Sitting, Cuff Size: Standard)   Pulse 58   Temp 97.8 °F (36.6 °C)   Resp 18   Ht 5' 11\" (1.803 m)   Wt 97.3 kg (214 lb 6.4 oz)   SpO2 93%   BMI 29.90 kg/m²      Physical Exam  Constitutional:       Appearance: Normal appearance.   HENT:      Head: Normocephalic and atraumatic.      Nose: Nose normal.     Eyes:      General: No scleral icterus.     Conjunctiva/sclera: Conjunctivae normal.       Cardiovascular:      Rate and Rhythm: Normal rate.   Pulmonary:      Effort: Pulmonary effort is normal.   Abdominal:      General: There is no distension.      " Palpations: Abdomen is soft.      Tenderness: There is no abdominal tenderness.      Comments: right inguinal seroma without signs of infection, unchanged     Musculoskeletal:         General: No signs of injury.     Skin:     General: Skin is warm.      Coloration: Skin is not jaundiced.     Neurological:      General: No focal deficit present.      Mental Status: He is alert and oriented to person, place, and time.     Psychiatric:         Mood and Affect: Mood normal.         Behavior: Behavior normal.                [1]   Past Medical History:  Diagnosis Date    Asthma     Gout     Kidney stones    [2]   Past Surgical History:  Procedure Laterality Date    APPENDECTOMY      BACK SURGERY      CYST REMOVAL      mouth    VT LAPAROSCOPY SURG CHOLECYSTECTOMY N/A 4/30/2025    Procedure: CHOLECYSTECTOMY LAPAROSCOPIC;  Surgeon: Elliot Krueger DO;  Location: [facility];  Service: General    VT LAPAROSCOPY SURG RPR INITIAL INGUINAL HERNIA Bilateral 4/30/2025    Procedure: REPAIR HERNIA INGUINAL, LAPAROSCOPIC;  Surgeon: Elliot Krueger DO;  Location: [facility];  Service: General    WISDOM TOOTH EXTRACTION     [3]   Family History  Problem Relation Name Age of Onset    COPD Mother      Cancer Mother      Asthma Mother      Cancer Father      Diabetes type II Father      No Known Problems Brother      No Known Problems Brother      No Known Problems Brother      No Known Problems Son      No Known Problems Son     [4] No Known Allergies  [5]   Current Outpatient Medications on File Prior to Visit   Medication Sig Dispense Refill    acetaminophen (TYLENOL) 500 mg tablet Take 2 tablets (1,000 mg total) by mouth every 6 (six) hours as needed for mild pain 60 tablet 0    allopurinol (ZYLOPRIM) 100 mg tablet       Fasenra subcutaneous injection       fluticasone (FLONASE) 50 mcg/act nasal spray       loratadine (CLARITIN) 10 mg tablet       Mounjaro 15 MG/0.5ML SOAJ       Multiple Vitamin (multivitamin) capsule Take  1 capsule by mouth in the morning.      omeprazole (PriLOSEC) 20 mg delayed release capsule Take 1 capsule (20 mg total) by mouth daily 30 capsule 3    tadalafil (CIALIS) 5 MG tablet       ZOLMitriptan (ZOMIG) 5 MG tablet Take 5 mg by mouth once as needed for migraine      Cholecalciferol (Vitamin D3) 1.25 MG (32397 UT) CAPS  (Patient not taking: Reported on 5/29/2025)      [DISCONTINUED] fexofenadine (ALLEGRA) 180 MG tablet Take 180 mg by mouth daily (Patient not taking: Reported on 5/15/2025)      [DISCONTINUED] indomethacin (INDOCIN) 50 mg capsule TAKE 1 CAPSULE BY MOUTH EVERY 8 HOURS IF NEEDED DURING GOUT FLARE WITH FOOD (Patient not taking: Reported on 5/15/2025)       No current facility-administered medications on file prior to visit.   [6]   Social History  Tobacco Use    Smoking status: Former     Types: Cigarettes    Smokeless tobacco: Never    Tobacco comments:     only smoked for two years   Vaping Use    Vaping status: Never Used   Substance and Sexual Activity    Alcohol use: Never    Drug use: Not Currently

## 2025-05-29 NOTE — LETTER
May 29, 2025     Patient: Gordon Aparicio  YOB: 1970  Date of Visit: 5/29/2025      To Whom it May Concern:    Gordon Aparicio is under my medical care. Gordon was seen in my office on 5/29/2025. Please excuse gordon from work today to be able to make his visit.    If you have any questions or concerns, please don't hesitate to call.         Sincerely,          Elliot Krueger, DO        CC: No Recipients

## 2025-06-25 ENCOUNTER — PREP FOR PROCEDURE (OUTPATIENT)
Dept: GASTROENTEROLOGY | Facility: CLINIC | Age: 55
End: 2025-06-25

## 2025-06-25 ENCOUNTER — OFFICE VISIT (OUTPATIENT)
Dept: SURGERY | Facility: CLINIC | Age: 55
End: 2025-06-25

## 2025-06-25 VITALS
TEMPERATURE: 97.5 F | OXYGEN SATURATION: 95 % | HEART RATE: 65 BPM | BODY MASS INDEX: 30.1 KG/M2 | HEIGHT: 71 IN | DIASTOLIC BLOOD PRESSURE: 81 MMHG | SYSTOLIC BLOOD PRESSURE: 125 MMHG | WEIGHT: 215 LBS

## 2025-06-25 DIAGNOSIS — Z12.11 ENCOUNTER FOR SCREENING COLONOSCOPY: ICD-10-CM

## 2025-06-25 DIAGNOSIS — Z12.11 SCREENING FOR COLON CANCER: Primary | ICD-10-CM

## 2025-06-25 DIAGNOSIS — S30.1XXD ABDOMINAL WALL SEROMA, SUBSEQUENT ENCOUNTER: Primary | ICD-10-CM

## 2025-06-25 PROCEDURE — 99024 POSTOP FOLLOW-UP VISIT: CPT | Performed by: STUDENT IN AN ORGANIZED HEALTH CARE EDUCATION/TRAINING PROGRAM

## 2025-06-25 NOTE — PROGRESS NOTES
Name: Deepak Aparicio      : 1970      MRN: [id number]  Encounter Provider: Elliot Krueger DO  Encounter Date: 2025   Encounter department: [facility]  :  Assessment & Plan  Abdominal wall seroma, subsequent encounter  55-year-old male with right inguinal seroma status post laparoscopic bilateral inguinal hernia repair with mesh on 2025   -See HPI  -Right groin seroma reduced in size, nontender to palpation, no signs of infection  -Thankfully the seroma has slowly been improving and getting smaller  - Patient denies any significant symptoms or infection  -Recommend to continue to monitor and hopefully will continue to get smaller and resolve  -If any questions or concerns patient to call the office for reevaluation  - Follow-up in office as needed    Encounter for screening colonoscopy  Gastroenterology referral placed  Orders:    Ambulatory Referral to Gastroenterology; Future        History of Present Illness   HPI  Deepak Aparicio is a 55 y.o. male who presents for evaluation of right groin seroma.  He states the area has been getting smaller.  Denies any pain.  Notes the only time it gets in the way is when he is riding a bike and he is bending.  No signs of infection or drainage.  Some mild intermittent right testicular discomfort.  History obtained from: patient    Review of Systems   Constitutional:  Negative for chills, fatigue and fever.   HENT:  Negative for congestion, hearing loss, rhinorrhea and sore throat.    Eyes:  Negative for pain and discharge.   Respiratory:  Negative for cough, chest tightness and shortness of breath.    Cardiovascular:  Negative for chest pain and palpitations.   Gastrointestinal:  Negative for abdominal pain, constipation, diarrhea, nausea and vomiting.   Endocrine: Negative for cold intolerance and heat intolerance.   Genitourinary:  Negative for difficulty urinating and dysuria.   Musculoskeletal:  Negative for back pain and  "neck pain.   Skin:  Negative for color change and rash.   Allergic/Immunologic: Negative for environmental allergies and food allergies.   Neurological:  Negative for seizures and headaches.   Hematological:  Does not bruise/bleed easily.   Psychiatric/Behavioral:  Negative for confusion and hallucinations.      Medical History Reviewed by provider this encounter:  Tobacco  Allergies  Meds  Problems  Med Hx  Surg Hx  Fam Hx     .  Past Medical History   Past Medical History[1]  Past Surgical History[2]  Family History[3]   reports that he has quit smoking. His smoking use included cigarettes. He has never used smokeless tobacco. He reports that he does not currently use drugs. He reports that he does not drink alcohol.  Current Outpatient Medications   Medication Instructions    acetaminophen (TYLENOL) 1,000 mg, Oral, Every 6 hours PRN    allopurinol (ZYLOPRIM) 100 mg tablet     Cholecalciferol (Vitamin D3) 1.25 MG (92386 UT) CAPS     Fasenra subcutaneous injection No dose, route, or frequency recorded.    fluticasone (FLONASE) 50 mcg/act nasal spray     loratadine (CLARITIN) 10 mg tablet     Mounjaro 15 MG/0.5ML SOAJ     Multiple Vitamin (multivitamin) capsule 1 capsule, Daily    omeprazole (PRILOSEC) 20 mg, Oral, Daily    tadalafil (CIALIS) 5 MG tablet     ZOLMitriptan (ZOMIG) 5 mg, Once as needed   Allergies[4]   Medications Ordered Prior to Encounter[5]   Social History[6]     Objective   /81 (BP Location: Left arm, Patient Position: Sitting, Cuff Size: Standard)   Pulse 65   Temp 97.5 °F (36.4 °C) (Temporal)   Ht 5' 11\" (1.803 m)   Wt 97.5 kg (215 lb)   SpO2 95%   BMI 29.99 kg/m²      Physical Exam  Constitutional:       Appearance: Normal appearance.   HENT:      Head: Normocephalic and atraumatic.      Nose: Nose normal.     Eyes:      General: No scleral icterus.     Conjunctiva/sclera: Conjunctivae normal.       Cardiovascular:      Rate and Rhythm: Normal rate.   Pulmonary:      Effort: " Pulmonary effort is normal.   Abdominal:      General: There is no distension.      Palpations: Abdomen is soft.      Tenderness: There is no abdominal tenderness.      Comments: Right groin seroma reduced in size, nontender to palpation, no signs of infection     Musculoskeletal:         General: No signs of injury.     Skin:     General: Skin is warm.      Coloration: Skin is not jaundiced.     Neurological:      General: No focal deficit present.      Mental Status: He is alert and oriented to person, place, and time.     Psychiatric:         Mood and Affect: Mood normal.         Behavior: Behavior normal.                  [1]   Past Medical History:  Diagnosis Date    Asthma     Gout     Kidney stones    [2]   Past Surgical History:  Procedure Laterality Date    APPENDECTOMY      BACK SURGERY      CYST REMOVAL      mouth    MT LAPAROSCOPY SURG CHOLECYSTECTOMY N/A 4/30/2025    Procedure: CHOLECYSTECTOMY LAPAROSCOPIC;  Surgeon: Elliot Krueger DO;  Location: [facility];  Service: General    MT LAPAROSCOPY SURG RPR INITIAL INGUINAL HERNIA Bilateral 4/30/2025    Procedure: REPAIR HERNIA INGUINAL, LAPAROSCOPIC;  Surgeon: Elliot Krueger DO;  Location: [facility];  Service: General    WISDOM TOOTH EXTRACTION     [3]   Family History  Problem Relation Name Age of Onset    COPD Mother      Cancer Mother      Asthma Mother      Cancer Father      Diabetes type II Father      No Known Problems Brother      No Known Problems Brother      No Known Problems Brother      No Known Problems Son      No Known Problems Son     [4] No Known Allergies  [5]   Current Outpatient Medications on File Prior to Visit   Medication Sig Dispense Refill    acetaminophen (TYLENOL) 500 mg tablet Take 2 tablets (1,000 mg total) by mouth every 6 (six) hours as needed for mild pain 60 tablet 0    allopurinol (ZYLOPRIM) 100 mg tablet       Cholecalciferol (Vitamin D3) 1.25 MG (99752 UT) CAPS       Fasenra subcutaneous injection        fluticasone (FLONASE) 50 mcg/act nasal spray       loratadine (CLARITIN) 10 mg tablet       Mounjaro 15 MG/0.5ML SOAJ       Multiple Vitamin (multivitamin) capsule Take 1 capsule by mouth in the morning.      omeprazole (PriLOSEC) 20 mg delayed release capsule Take 1 capsule (20 mg total) by mouth daily 30 capsule 3    tadalafil (CIALIS) 5 MG tablet       ZOLMitriptan (ZOMIG) 5 MG tablet Take 5 mg by mouth once as needed for migraine       No current facility-administered medications on file prior to visit.   [6]   Social History  Tobacco Use    Smoking status: Former     Types: Cigarettes    Smokeless tobacco: Never    Tobacco comments:     only smoked for two years   Vaping Use    Vaping status: Never Used   Substance and Sexual Activity    Alcohol use: Never    Drug use: Not Currently

## 2025-06-25 NOTE — ASSESSMENT & PLAN NOTE
55-year-old male with right inguinal seroma status post laparoscopic bilateral inguinal hernia repair with mesh on 4/30/2025   -See HPI  -Right groin seroma reduced in size, nontender to palpation, no signs of infection  -Thankfully the seroma has slowly been improving and getting smaller  - Patient denies any significant symptoms or infection  -Recommend to continue to monitor and hopefully will continue to get smaller and resolve  -If any questions or concerns patient to call the office for reevaluation  - Follow-up in office as needed

## 2025-07-15 ENCOUNTER — TELEPHONE (OUTPATIENT)
Age: 55
End: 2025-07-15

## 2025-07-28 ENCOUNTER — OFFICE VISIT (OUTPATIENT)
Dept: FAMILY MEDICINE CLINIC | Facility: CLINIC | Age: 55
End: 2025-07-28
Payer: COMMERCIAL

## 2025-07-28 VITALS
BODY MASS INDEX: 29.54 KG/M2 | SYSTOLIC BLOOD PRESSURE: 124 MMHG | OXYGEN SATURATION: 94 % | DIASTOLIC BLOOD PRESSURE: 80 MMHG | TEMPERATURE: 98.7 F | WEIGHT: 211 LBS | HEIGHT: 71 IN | HEART RATE: 79 BPM

## 2025-07-28 DIAGNOSIS — A69.20 ERYTHEMA MIGRANS (LYME DISEASE): Primary | ICD-10-CM

## 2025-07-28 PROBLEM — E11.9 TYPE 2 DIABETES MELLITUS WITHOUT COMPLICATION, WITHOUT LONG-TERM CURRENT USE OF INSULIN (HCC): Status: RESOLVED | Noted: 2025-03-18 | Resolved: 2025-07-28

## 2025-07-28 PROCEDURE — 99214 OFFICE O/P EST MOD 30 MIN: CPT | Performed by: FAMILY MEDICINE

## 2025-07-28 RX ORDER — DOXYCYCLINE HYCLATE 100 MG
100 TABLET ORAL 2 TIMES DAILY
Qty: 42 TABLET | Refills: 0 | Status: SHIPPED | OUTPATIENT
Start: 2025-07-28 | End: 2025-08-18

## (undated) DEVICE — SUT MONOCRYL 4-0 PS-2 18 IN Y496G

## (undated) DEVICE — TROCAR: Brand: KII FIOS FIRST ENTRY

## (undated) DEVICE — SCD SEQUENTIAL COMPRESSION COMFORT SLEEVE MEDIUM KNEE LENGTH: Brand: KENDALL SCD

## (undated) DEVICE — INTENDED FOR TISSUE SEPARATION, AND OTHER PROCEDURES THAT REQUIRE A SHARP SURGICAL BLADE TO PUNCTURE OR CUT.: Brand: BARD-PARKER SAFETY BLADES SIZE 15, STERILE

## (undated) DEVICE — REM POLYHESIVE ADULT PATIENT RETURN ELECTRODE: Brand: VALLEYLAB

## (undated) DEVICE — LAPAROSCOPIC WIRE L-HOOK ELECTRODE COATED: Brand: CLEANCOAT

## (undated) DEVICE — CHLORAPREP HI-LITE 26ML ORANGE

## (undated) DEVICE — DRAPE EQUIPMENT RF WAND

## (undated) DEVICE — ALLENTOWN LAP CHOLE APP PACK: Brand: CARDINAL HEALTH

## (undated) DEVICE — NEPTUNE E-SEP SMOKE EVACUATION PENCIL, COATED, 70MM BLADE, PUSH BUTTON SWITCH: Brand: NEPTUNE E-SEP

## (undated) DEVICE — [HIGH FLOW INSUFFLATOR,  DO NOT USE IF PACKAGE IS DAMAGED,  KEEP DRY,  KEEP AWAY FROM SUNLIGHT,  PROTECT FROM HEAT AND RADIOACTIVE SOURCES.]: Brand: PNEUMOSURE

## (undated) DEVICE — 3M™ STERI-STRIP™ REINFORCED ADHESIVE SKIN CLOSURES, R1546, 1/4 IN X 4 IN (6 MM X 100 MM), 10 STRIPS/ENVELOPE: Brand: 3M™ STERI-STRIP™

## (undated) DEVICE — TISSUE RETRIEVAL SYSTEM: Brand: INZII RETRIEVAL SYSTEM

## (undated) DEVICE — TOWEL SURG XR DETECT GREEN STRL RFD

## (undated) DEVICE — 2, DISPOSABLE SUCTION/IRRIGATOR WITHOUT DISPOSABLE TIP: Brand: STRYKEFLOW

## (undated) DEVICE — TUBING SMOKE EVAC W/FILTRATION DEVICE PLUMEPORT ACTIV

## (undated) DEVICE — GLOVE INDICATOR PI UNDERGLOVE SZ 7 BLUE

## (undated) DEVICE — SUTURING DEVICE: Brand: ENDO STITCH

## (undated) DEVICE — GLOVE SRG BIOGEL 7

## (undated) DEVICE — 4-PORT MANIFOLD: Brand: NEPTUNE 2

## (undated) DEVICE — SUT VICRYL 0 UR-6 27 IN J603H

## (undated) DEVICE — 3M™ TEGADERM™ TRANSPARENT FILM DRESSING FRAME STYLE, 1624W, 2-3/8 IN X 2-3/4 IN (6 CM X 7 CM), 100/CT 4CT/CASE: Brand: 3M™ TEGADERM™

## (undated) DEVICE — STAPLER VLOC 180  0 8IN  VLOCA008L

## (undated) DEVICE — METZENBAUM ADTEC SINGLE USE DISSECTING SCISSORS, SHAFT ONLY, MONOPOLAR, CURVED TO LEFT, WORKING LENGTH: 12 1/4", (310 MM), DIAM. 5 MM, INSULATED, DOUBLE ACTION, STERILE, DISPOSABLE, PACKAGE OF 10 PIECES: Brand: AESCULAP

## (undated) DEVICE — ANTI-FOG SOLUTION WITH FOAM PAD: Brand: DEVON

## (undated) DEVICE — COTTON TIP APPLICTOR 2 PK

## (undated) DEVICE — GAUZE SPONGES,8 PLY: Brand: CURITY

## (undated) DEVICE — TROCAR: Brand: KII® SLEEVE